# Patient Record
Sex: FEMALE | Race: OTHER | HISPANIC OR LATINO | URBAN - METROPOLITAN AREA
[De-identification: names, ages, dates, MRNs, and addresses within clinical notes are randomized per-mention and may not be internally consistent; named-entity substitution may affect disease eponyms.]

---

## 2018-06-06 PROBLEM — Z00.00 ENCOUNTER FOR PREVENTIVE HEALTH EXAMINATION: Status: ACTIVE | Noted: 2018-06-06

## 2020-02-01 ENCOUNTER — OUTPATIENT (OUTPATIENT)
Dept: OUTPATIENT SERVICES | Facility: HOSPITAL | Age: 65
LOS: 1 days | End: 2020-02-01
Payer: MEDICAID

## 2020-02-26 ENCOUNTER — INPATIENT (INPATIENT)
Facility: HOSPITAL | Age: 65
LOS: 7 days | Discharge: HOME | DRG: 645 | End: 2020-03-05
Attending: PSYCHIATRY & NEUROLOGY | Admitting: PSYCHIATRY & NEUROLOGY
Payer: MEDICAID

## 2020-02-26 VITALS
HEART RATE: 76 BPM | TEMPERATURE: 98 F | OXYGEN SATURATION: 99 % | RESPIRATION RATE: 20 BRPM | DIASTOLIC BLOOD PRESSURE: 95 MMHG | WEIGHT: 205.03 LBS | HEIGHT: 64 IN | SYSTOLIC BLOOD PRESSURE: 155 MMHG

## 2020-02-26 PROCEDURE — 71045 X-RAY EXAM CHEST 1 VIEW: CPT | Mod: 26

## 2020-02-26 PROCEDURE — 99285 EMERGENCY DEPT VISIT HI MDM: CPT

## 2020-02-26 PROCEDURE — 93010 ELECTROCARDIOGRAM REPORT: CPT

## 2020-02-26 RX ORDER — MECLIZINE HCL 12.5 MG
25 TABLET ORAL ONCE
Refills: 0 | Status: COMPLETED | OUTPATIENT
Start: 2020-02-26 | End: 2020-02-26

## 2020-02-26 RX ORDER — METOCLOPRAMIDE HCL 10 MG
10 TABLET ORAL ONCE
Refills: 0 | Status: COMPLETED | OUTPATIENT
Start: 2020-02-26 | End: 2020-02-26

## 2020-02-26 RX ORDER — SODIUM CHLORIDE 9 MG/ML
1000 INJECTION INTRAMUSCULAR; INTRAVENOUS; SUBCUTANEOUS ONCE
Refills: 0 | Status: COMPLETED | OUTPATIENT
Start: 2020-02-26 | End: 2020-02-26

## 2020-02-26 RX ADMIN — Medication 25 MILLIGRAM(S): at 23:45

## 2020-02-26 RX ADMIN — SODIUM CHLORIDE 1000 MILLILITER(S): 9 INJECTION INTRAMUSCULAR; INTRAVENOUS; SUBCUTANEOUS at 23:45

## 2020-02-26 RX ADMIN — Medication 10 MILLIGRAM(S): at 23:45

## 2020-02-26 NOTE — ED PROVIDER NOTE - NS ED ROS FT
GENERAL: No fever or chills, EYES: no change in vision, HEENT: no trouble speaking, CARDIAC: no chest pain, palpitation PULMONARY: no cough or SOB, GI: no abdominal pain, + nausea, no vomiting, no diarrhea or constipation, : No changes in urination, SKIN: no rashes, NEURO: +dizziness, +numbness, no headache,  MSK: No muscle pain ~Sveta Monet MD

## 2020-02-26 NOTE — ED ADULT NURSE NOTE - OBJECTIVE STATEMENT
66 y/o female PMH HTN presents to ED reporting numbness to L side of face, dizziness and weakness. Pt reports symptoms began around 9AM today. Pt also reports feeling nauseous and abdominal pain. On exam, AOx3, speaking in complete sentences. Lung sounds CTA, NAD. Abdomen soft, non-tender, non-distended, normoactive bowel sounds. PERRL 3mm, equal strength and sensation in all 4 extremities. Pt denies CP, SOB, fever/chills at this time. Seen and evaluated by CAROLINA ANDERSON in place NSR HR 60s.

## 2020-02-26 NOTE — ED PROVIDER NOTE - CLINICAL SUMMARY MEDICAL DECISION MAKING FREE TEXT BOX
Sveta Monet MD: 65 yoF PMH Livingston's Palsy left side, HTN, Hyperthyroid p/w dizziness x5 day with numbness right side numbness of face radiating the back of the head. states that she feels like her head feels heavy. aggravated by head position. Most likely peripheral vertigo but will evaluate for stroke. labs, ekg, trop, cth, cxr

## 2020-02-26 NOTE — ED PROVIDER NOTE - OBJECTIVE STATEMENT
Sveta Monet MD: 65 yoF PMH Livingston's Palsy left side, HTN, Hyperthyroid p/w dizziness x5 day with numbness right side numbness of face radiating the back of the head. states that she feels like her head feels heavy. Pt states that her headache is worse with head movement. Pt report nausea but melinda fever, cp, sob, abd pain, blood in stool. Sveta Monet MD: 65 yoF PMH Livingston's Palsy left side, HTN, Hyperthyroid p/w dizziness x5 day with numbness right side numbness of face radiating the back of the head. states that she feels like her head feels heavy. Pt states that her headache is worse with head movement. Pt report nausea but denies fever, cp, sob, abd pain, blood in stool.

## 2020-02-26 NOTE — ED PROVIDER NOTE - ATTENDING CONTRIBUTION TO CARE
65 yoF PMH Livingston's Palsy left side, HTN, Hyperthyroid p/w dizziness x5 day with associated headache, nausea, with sob as well, no cough, chest pain, or fever.  pt vertiginous when she stood, Neuro: Alert, awake,coherent, interactive, cooperative/consolable. Left sided facial palsy chronic upper and lower with otherwise CN 3-12 intact and WITHOUT deficits. NO focal weakness; NO drift; NO droop; pos nystagmus; NO vertigo; NO diplopia; NO headache, NO numbness, tingling, or weakness; NO dysmetria; NO dysdidochokinesia; INTACT gait (tandem, toe and heel). cthead, labs, cxr, treat vertigo and reassess.

## 2020-02-26 NOTE — ED PROVIDER NOTE - PHYSICAL EXAMINATION
Gen: AAOx3, non-toxic  Head: NCAT  HEENT: EOMI, PERRLA, oral mucosa moist, normal conjunctiva  Lung: CTAB, no respiratory distress, no wheezes/rhonchi/rales B/L, speaking in full sentences  CV: RRR, no murmurs, rubs or gallops  Abd: soft, NTND, no guarding, no CVA tenderness, no rebound tenderness  MSK: no visible deformities, full range of motion of all 4 exts  Neuro: Awake, alert, and oriented.  Cranial nerves 2-12 intact.  5/5 strength in all muscle groups.  2+ patellar reflexes bilaterally.  Cerebellar function intact by finger-to-nose testing.  Sensation grossly intact.  Negative Romberg sign.  + ataxic gait falling to he right side.   Skin: Warm, well perfused, no rash  Psych: normal affect.   ~Sveta Monet MD

## 2020-02-27 DIAGNOSIS — Z90.49 ACQUIRED ABSENCE OF OTHER SPECIFIED PARTS OF DIGESTIVE TRACT: Chronic | ICD-10-CM

## 2020-02-27 DIAGNOSIS — Q67.0 CONGENITAL FACIAL ASYMMETRY: ICD-10-CM

## 2020-02-27 DIAGNOSIS — Z98.890 OTHER SPECIFIED POSTPROCEDURAL STATES: Chronic | ICD-10-CM

## 2020-02-27 LAB
ALBUMIN SERPL ELPH-MCNC: 4.4 G/DL — SIGNIFICANT CHANGE UP (ref 3.3–5)
ALP SERPL-CCNC: 125 U/L — HIGH (ref 40–120)
ALT FLD-CCNC: 13 U/L — SIGNIFICANT CHANGE UP (ref 10–45)
ANION GAP SERPL CALC-SCNC: 18 MMOL/L — HIGH (ref 5–17)
APPEARANCE UR: CLEAR — SIGNIFICANT CHANGE UP
AST SERPL-CCNC: 26 U/L — SIGNIFICANT CHANGE UP (ref 10–40)
BILIRUB SERPL-MCNC: 0.2 MG/DL — SIGNIFICANT CHANGE UP (ref 0.2–1.2)
BILIRUB UR-MCNC: NEGATIVE — SIGNIFICANT CHANGE UP
BUN SERPL-MCNC: 15 MG/DL — SIGNIFICANT CHANGE UP (ref 7–23)
CALCIUM SERPL-MCNC: 9.4 MG/DL — SIGNIFICANT CHANGE UP (ref 8.4–10.5)
CHLORIDE SERPL-SCNC: 100 MMOL/L — SIGNIFICANT CHANGE UP (ref 96–108)
CO2 SERPL-SCNC: 22 MMOL/L — SIGNIFICANT CHANGE UP (ref 22–31)
COLOR SPEC: SIGNIFICANT CHANGE UP
CREAT SERPL-MCNC: 0.87 MG/DL — SIGNIFICANT CHANGE UP (ref 0.5–1.3)
DIFF PNL FLD: NEGATIVE — SIGNIFICANT CHANGE UP
GLUCOSE SERPL-MCNC: 137 MG/DL — HIGH (ref 70–99)
GLUCOSE UR QL: NEGATIVE — SIGNIFICANT CHANGE UP
HCT VFR BLD CALC: 42.1 % — SIGNIFICANT CHANGE UP (ref 34.5–45)
HGB BLD-MCNC: 13.5 G/DL — SIGNIFICANT CHANGE UP (ref 11.5–15.5)
KETONES UR-MCNC: NEGATIVE — SIGNIFICANT CHANGE UP
LEUKOCYTE ESTERASE UR-ACNC: ABNORMAL
MCHC RBC-ENTMCNC: 28.1 PG — SIGNIFICANT CHANGE UP (ref 27–34)
MCHC RBC-ENTMCNC: 32.1 GM/DL — SIGNIFICANT CHANGE UP (ref 32–36)
MCV RBC AUTO: 87.7 FL — SIGNIFICANT CHANGE UP (ref 80–100)
NITRITE UR-MCNC: NEGATIVE — SIGNIFICANT CHANGE UP
NRBC # BLD: 0 /100 WBCS — SIGNIFICANT CHANGE UP (ref 0–0)
PH UR: 7 — SIGNIFICANT CHANGE UP (ref 5–8)
PLATELET # BLD AUTO: 282 K/UL — SIGNIFICANT CHANGE UP (ref 150–400)
POTASSIUM SERPL-MCNC: 3.1 MMOL/L — LOW (ref 3.5–5.3)
POTASSIUM SERPL-SCNC: 3.1 MMOL/L — LOW (ref 3.5–5.3)
PROT SERPL-MCNC: 7.9 G/DL — SIGNIFICANT CHANGE UP (ref 6–8.3)
PROT UR-MCNC: NEGATIVE — SIGNIFICANT CHANGE UP
RBC # BLD: 4.8 M/UL — SIGNIFICANT CHANGE UP (ref 3.8–5.2)
RBC # FLD: 13.7 % — SIGNIFICANT CHANGE UP (ref 10.3–14.5)
SODIUM SERPL-SCNC: 140 MMOL/L — SIGNIFICANT CHANGE UP (ref 135–145)
SP GR SPEC: 1.01 — SIGNIFICANT CHANGE UP (ref 1.01–1.02)
TROPONIN T, HIGH SENSITIVITY RESULT: 9 NG/L — SIGNIFICANT CHANGE UP (ref 0–51)
TROPONIN T, HIGH SENSITIVITY RESULT: 9 NG/L — SIGNIFICANT CHANGE UP (ref 0–51)
UROBILINOGEN FLD QL: NEGATIVE — SIGNIFICANT CHANGE UP
WBC # BLD: 8.1 K/UL — SIGNIFICANT CHANGE UP (ref 3.8–10.5)
WBC # FLD AUTO: 8.1 K/UL — SIGNIFICANT CHANGE UP (ref 3.8–10.5)

## 2020-02-27 PROCEDURE — 70496 CT ANGIOGRAPHY HEAD: CPT | Mod: 26

## 2020-02-27 PROCEDURE — 70553 MRI BRAIN STEM W/O & W/DYE: CPT | Mod: 26

## 2020-02-27 PROCEDURE — 99223 1ST HOSP IP/OBS HIGH 75: CPT

## 2020-02-27 PROCEDURE — 70450 CT HEAD/BRAIN W/O DYE: CPT | Mod: 26,59

## 2020-02-27 PROCEDURE — 93306 TTE W/DOPPLER COMPLETE: CPT | Mod: 26

## 2020-02-27 RX ORDER — ENOXAPARIN SODIUM 100 MG/ML
40 INJECTION SUBCUTANEOUS DAILY
Refills: 0 | Status: DISCONTINUED | OUTPATIENT
Start: 2020-02-27 | End: 2020-03-01

## 2020-02-27 RX ORDER — GABAPENTIN 400 MG/1
400 CAPSULE ORAL
Refills: 0 | Status: DISCONTINUED | OUTPATIENT
Start: 2020-02-27 | End: 2020-03-04

## 2020-02-27 RX ORDER — ASPIRIN/CALCIUM CARB/MAGNESIUM 324 MG
81 TABLET ORAL DAILY
Refills: 0 | Status: DISCONTINUED | OUTPATIENT
Start: 2020-02-27 | End: 2020-03-01

## 2020-02-27 RX ORDER — ZOLPIDEM TARTRATE 10 MG/1
1 TABLET ORAL
Qty: 0 | Refills: 0 | DISCHARGE

## 2020-02-27 RX ORDER — FOLIC ACID 0.8 MG
1 TABLET ORAL DAILY
Refills: 0 | Status: DISCONTINUED | OUTPATIENT
Start: 2020-02-27 | End: 2020-03-05

## 2020-02-27 RX ORDER — HYDROCHLOROTHIAZIDE 25 MG
12.5 TABLET ORAL DAILY
Refills: 0 | Status: DISCONTINUED | OUTPATIENT
Start: 2020-02-27 | End: 2020-03-05

## 2020-02-27 RX ORDER — ACETAMINOPHEN 500 MG
650 TABLET ORAL EVERY 6 HOURS
Refills: 0 | Status: DISCONTINUED | OUTPATIENT
Start: 2020-02-27 | End: 2020-03-05

## 2020-02-27 RX ORDER — ACETAMINOPHEN 500 MG
975 TABLET ORAL ONCE
Refills: 0 | Status: COMPLETED | OUTPATIENT
Start: 2020-02-27 | End: 2020-02-27

## 2020-02-27 RX ORDER — CLOPIDOGREL BISULFATE 75 MG/1
75 TABLET, FILM COATED ORAL DAILY
Refills: 0 | Status: DISCONTINUED | OUTPATIENT
Start: 2020-02-27 | End: 2020-02-28

## 2020-02-27 RX ORDER — FOLIC ACID 0.8 MG
1 TABLET ORAL
Qty: 0 | Refills: 0 | DISCHARGE

## 2020-02-27 RX ORDER — POTASSIUM CHLORIDE 20 MEQ
40 PACKET (EA) ORAL ONCE
Refills: 0 | Status: COMPLETED | OUTPATIENT
Start: 2020-02-27 | End: 2020-02-27

## 2020-02-27 RX ORDER — GABAPENTIN 400 MG/1
1 CAPSULE ORAL
Qty: 0 | Refills: 0 | DISCHARGE

## 2020-02-27 RX ORDER — ATORVASTATIN CALCIUM 80 MG/1
80 TABLET, FILM COATED ORAL AT BEDTIME
Refills: 0 | Status: DISCONTINUED | OUTPATIENT
Start: 2020-02-27 | End: 2020-03-04

## 2020-02-27 RX ORDER — LOSARTAN POTASSIUM 100 MG/1
50 TABLET, FILM COATED ORAL DAILY
Refills: 0 | Status: DISCONTINUED | OUTPATIENT
Start: 2020-02-27 | End: 2020-03-03

## 2020-02-27 RX ADMIN — Medication 650 MILLIGRAM(S): at 15:40

## 2020-02-27 RX ADMIN — Medication 12.5 MILLIGRAM(S): at 13:04

## 2020-02-27 RX ADMIN — CLOPIDOGREL BISULFATE 75 MILLIGRAM(S): 75 TABLET, FILM COATED ORAL at 13:03

## 2020-02-27 RX ADMIN — Medication 40 MILLIEQUIVALENT(S): at 01:55

## 2020-02-27 RX ADMIN — ATORVASTATIN CALCIUM 80 MILLIGRAM(S): 80 TABLET, FILM COATED ORAL at 21:49

## 2020-02-27 RX ADMIN — LOSARTAN POTASSIUM 50 MILLIGRAM(S): 100 TABLET, FILM COATED ORAL at 13:03

## 2020-02-27 RX ADMIN — Medication 30 MILLIGRAM(S): at 13:36

## 2020-02-27 RX ADMIN — Medication 1 MILLIGRAM(S): at 13:03

## 2020-02-27 RX ADMIN — GABAPENTIN 400 MILLIGRAM(S): 400 CAPSULE ORAL at 19:22

## 2020-02-27 RX ADMIN — Medication 975 MILLIGRAM(S): at 05:18

## 2020-02-27 RX ADMIN — ENOXAPARIN SODIUM 40 MILLIGRAM(S): 100 INJECTION SUBCUTANEOUS at 13:04

## 2020-02-27 RX ADMIN — Medication 81 MILLIGRAM(S): at 13:04

## 2020-02-27 NOTE — PHYSICAL THERAPY INITIAL EVALUATION ADULT - IMPAIRMENTS FOUND, PT EVAL
joint integrity and mobility/muscle strength/aerobic capacity/endurance/gait, locomotion, and balance

## 2020-02-27 NOTE — OCCUPATIONAL THERAPY INITIAL EVALUATION ADULT - ADDITIONAL COMMENTS
CONTINUED. CT HEAD 2/27: No acute intracranial hemorrhage. Asymmetric prominence of the right superior anastomotic vein, indeterminate. This may represent anatomic variation, but in the appropriate clinical setting, the thrombosis is a consideration. Contrast-enhanced MRV can be considered if clinically warranted.  CTA HEAD 2/17: Normal CTV head without cortical venous or deep dural sinus thrombosis.

## 2020-02-27 NOTE — CONSULT NOTE ADULT - SUBJECTIVE AND OBJECTIVE BOX
CHIEF COMPLAINT:    HISTORY OF PRESENT ILLNESS:    PAST MEDICAL & SURGICAL HISTORY:  Miscarriage: 1 spontaneous 1st trimester miscarriage  Hypothyroid: S/P Radioactive iodine  Vertigo  H/O Bell's palsy: Left sided  HTN (hypertension)  HLD (hyperlipidemia)  History of hysteroscopy  S/P appendectomy          MEDICATIONS:  aspirin enteric coated 81 milliGRAM(s) Oral daily  clopidogrel Tablet 75 milliGRAM(s) Oral daily  enoxaparin Injectable 40 milliGRAM(s) SubCutaneous daily  hydrochlorothiazide 12.5 milliGRAM(s) Oral daily  losartan 50 milliGRAM(s) Oral daily        gabapentin 400 milliGRAM(s) Oral two times a day  PARoxetine 30 milliGRAM(s) Oral daily      atorvastatin 80 milliGRAM(s) Oral at bedtime    folic acid 1 milliGRAM(s) Oral daily      FAMILY HISTORY:  Family history of miscarriage: Mother had two miscarriages, not sure what trimester      SOCIAL HISTORY:    [ ] Non-smoker  [ ] Smoker  [ ] Alcohol    Allergies    No Known Allergies    Intolerances    	    REVIEW OF SYSTEMS:  CONSTITUTIONAL: No fever, weight loss, or fatigue  EYES: No eye pain, visual disturbances, or discharge  ENMT:  No difficulty hearing, tinnitus, vertigo; No sinus or throat pain  NECK: No pain or stiffness  RESPIRATORY: No cough, wheezing, chills or hemoptysis; No Shortness of Breath  CARDIOVASCULAR: No chest pain, palpitations, passing out, dizziness, or leg swelling  GASTROINTESTINAL: No abdominal or epigastric pain. No nausea, vomiting, or hematemesis; No diarrhea or constipation. No melena or hematochezia.  GENITOURINARY: No dysuria, frequency, hematuria, or incontinence  NEUROLOGICAL: No headaches, memory loss, loss of strength, numbness, or tremors  SKIN: No itching, burning, rashes, or lesions   LYMPH Nodes: No enlarged glands  ENDOCRINE: No heat or cold intolerance; No hair loss  MUSCULOSKELETAL: No joint pain or swelling; No muscle, back, or extremity pain  PSYCHIATRIC: No depression, anxiety, mood swings, or difficulty sleeping  HEME/LYMPH: No easy bruising, or bleeding gums  ALLERY AND IMMUNOLOGIC: No hives or eczema	    [ ] All others negative	  [ ] Unable to obtain    PHYSICAL EXAM:  T(C): 36.8 (02-27-20 @ 12:28), Max: 36.9 (02-26-20 @ 18:22)  HR: 72 (02-27-20 @ 12:28) (62 - 76)  BP: 142/79 (02-27-20 @ 12:28) (125/71 - 155/95)  RR: 16 (02-27-20 @ 12:28) (14 - 20)  SpO2: 97% (02-27-20 @ 12:28) (97% - 100%)  Wt(kg): --  I&O's Summary      Appearance: Normal	  HEENT:   Normal oral mucosa, PERRL, EOMI	  Lymphatic: No lymphadenopathy  Cardiovascular: Normal S1 S2, No JVD, No murmurs, No edema  Respiratory: Lungs clear to auscultation	  Psychiatry: A & O x 3, Mood & affect appropriate  Gastrointestinal:  Soft, Non-tender, + BS	  Skin: No rashes, No ecchymoses, No cyanosis	  Neurologic: Non-focal  Extremities: Normal range of motion, No clubbing, cyanosis or edema  Vascular: Peripheral pulses palpable 2+ bilaterally    TELEMETRY: 	SR    ECG:  	  RADIOLOGY:  < from: CT Angio Head w/ IV Cont (02.27.20 @ 03:08) >    EXAM:  CT ANGIO BRAIN (W)AW IC                            PROCEDURE DATE:  02/27/2020            INTERPRETATION:  CLINICAL INFORMATION: Dizziness, nondiagnostic head CT for cortical vein thrombosis.    EXAM: Axial CT images of the head were obtained after the administration of IV contrast, contrast bolus was optimized to evaluate the deep veins. Multiplanar and 3-D/MIP reformats were generated. 70 cc Omnipaque 300 administered, 30 cc discarded.     CT dose lowering techniques were used, to include: automated exposure control, adjustment for patient size, and/or use of iterative reconstruction.?     COMPARISON: Same day noncontrast CT head    FINDINGS:    The deep dural venous sinuses and cortical veins are patent. There is asymmetric prominence of the right superior anastomotic vein without cortical vein thrombosis. Calcified plaque is present in the intracranial portion of the internal carotid arteries. Incidental fetal origin of the right PCA.    No mass effect or hydrocephalus. Parenchymal volume and density is within normal limits for age.    Partial effusion in the right mastoid. Scattered paranasal sinus mucosal thickening without air-fluid level. Intact calvarium.     IMPRESSION:    Normal CTV head without cortical venous or deep dural sinus thrombosis..                KRIS CURRY M.D., RADIOLOGY RESIDENT  This document has been electronically signed.  MELVA MARROQUIN M.D., ATTENDING RADIOLOGIST  This document has been electronically signed. Feb 27 2020  4:01AM                < end of copied text >    OTHER: 	  	  LABS:	 	    CARDIAC MARKERS:                                  13.5   8.10  )-----------( 282      ( 26 Feb 2020 23:55 )             42.1     02-26    140  |  100  |  15  ----------------------------<  137<H>  3.1<L>   |  22  |  0.87    Ca    9.4      26 Feb 2020 23:55    TPro  7.9  /  Alb  4.4  /  TBili  0.2  /  DBili  x   /  AST  26  /  ALT  13  /  AlkPhos  125<H>  02-26    proBNP:   Lipid Profile:   HgA1c:   TSH: CHIEF COMPLAINT:  CVA      HISTORY OF PRESENT ILLNESS:  65y R-handed F with PMH of HTN, HLD, DM, L. sided bell's palsy, vertigo, hypothyroidism S/P radioactive Iodine presenting with s/o HA, subjective facial numbness and facial heaviness, vertical and horizontal diplopia unclear if binocular or monocular, and gait dysequilibrium.  LKN: Unclear precise time, but at least 5 days ago.     Describes feeling numbness in her face as well as HA first.  Describes HA as bifrontal radiating to the back and throbbing in nature waxing from 2-8/10 w/o photophobia or phonophobia.  Endorses associated nausea.  She then developed facial droop on the right and feeling unsteady with her gait requiring touch off from furniture frequently.  She describes dizziness as light-headedness and pre-syncopal sensation.  All of her symptoms are exacerbated by position changes.   She additionally reports having vertigo in the past, but notes this is distinctly different as she does not feel the room spinning sensation she had at initial presentations.     Neurology called for potential of venous sinus thrombosis.  CTV confirmed.       PAST MEDICAL & SURGICAL HISTORY:  Miscarriage: 1 spontaneous 1st trimester miscarriage  Hypothyroid: S/P Radioactive iodine  Vertigo  H/O Bell's palsy: Left sided  HTN (hypertension)  HLD (hyperlipidemia)  History of hysteroscopy  S/P appendectomy          MEDICATIONS:  aspirin enteric coated 81 milliGRAM(s) Oral daily  clopidogrel Tablet 75 milliGRAM(s) Oral daily  enoxaparin Injectable 40 milliGRAM(s) SubCutaneous daily  hydrochlorothiazide 12.5 milliGRAM(s) Oral daily  losartan 50 milliGRAM(s) Oral daily        gabapentin 400 milliGRAM(s) Oral two times a day  PARoxetine 30 milliGRAM(s) Oral daily      atorvastatin 80 milliGRAM(s) Oral at bedtime    folic acid 1 milliGRAM(s) Oral daily      FAMILY HISTORY:  Family history of miscarriage: Mother had two miscarriages, not sure what trimester      SOCIAL HISTORY:    [ ] Non-smoker  [ ] Smoker  [ ] Alcohol    Allergies    No Known Allergies    Intolerances    	    REVIEW OF SYSTEMS:  CONSTITUTIONAL: No fever, weight loss, or fatigue  EYES: No eye pain, visual disturbances, or discharge  ENMT:  No difficulty hearing, tinnitus, vertigo; No sinus or throat pain  NECK: No pain or stiffness  RESPIRATORY: No cough, wheezing, chills or hemoptysis; No Shortness of Breath  CARDIOVASCULAR: No chest pain, palpitations, passing out, dizziness, or leg swelling  GASTROINTESTINAL: No abdominal or epigastric pain. No nausea, vomiting, or hematemesis; No diarrhea or constipation. No melena or hematochezia.  GENITOURINARY: No dysuria, frequency, hematuria, or incontinence  NEUROLOGICAL: No headaches, memory loss, loss of strength, numbness, or tremors  SKIN: No itching, burning, rashes, or lesions   LYMPH Nodes: No enlarged glands  ENDOCRINE: No heat or cold intolerance; No hair loss  MUSCULOSKELETAL: No joint pain or swelling; No muscle, back, or extremity pain  PSYCHIATRIC: No depression, anxiety, mood swings, or difficulty sleeping  HEME/LYMPH: No easy bruising, or bleeding gums  ALLERY AND IMMUNOLOGIC: No hives or eczema	    [ ] All others negative	  [ ] Unable to obtain    PHYSICAL EXAM:  T(C): 36.8 (02-27-20 @ 12:28), Max: 36.9 (02-26-20 @ 18:22)  HR: 72 (02-27-20 @ 12:28) (62 - 76)  BP: 142/79 (02-27-20 @ 12:28) (125/71 - 155/95)  RR: 16 (02-27-20 @ 12:28) (14 - 20)  SpO2: 97% (02-27-20 @ 12:28) (97% - 100%)  Wt(kg): --  I&O's Summary      Appearance: Normal	  HEENT:   Normal oral mucosa, PERRL, EOMI	  Lymphatic: No lymphadenopathy  Cardiovascular: Normal S1 S2, No JVD, No murmurs, No edema  Respiratory: Lungs clear to auscultation	  Psychiatry: A & O x 3, Mood & affect appropriate  Gastrointestinal:  Soft, Non-tender, + BS	  Skin: No rashes, No ecchymoses, No cyanosis	  Extremities: Normal range of motion, No clubbing, cyanosis or edema  Vascular: Peripheral pulses palpable 2+ bilaterally  Neuro   	Mental Status: Awake, alert, oriented to person, place, situation, time. Normal affect. Follows all commands.  	Language: No dysarthria, fluent with preserved naming, repetition and comprehension.    	Cranial Nerves: PERRLA (R = 3mm, L = 3mm). Visual fields intact. EOMI no nystagmus. V1-3 intact to light touch.  Mild L. facial droop at nasolabial fold.  Mod-Sev R. facial droop including frontalis.  Mildly reduced R. eye closure strength. Hearing grossly normal to conversation but hears tuning fork louder on left vs right.  Symmetric palate elevation in midline.  Head turning & shoulder shrug intact b/l. Tongue midline, normal movements, no atrophy.  	Motor: Normal muscle bulk & tone. No noticeable tremor, myoclonus or pronator drift. 5/5 strength on individual strength testing, LLE give way weakness due to pain.  	Sensation: Symmetric B/L preserved sensation to light touch, pin prick, position.    	Cortical: No extinction on double simultaneous touch and no signs of neglect.   	Reflexes: 2/4 throughout.  Plantar Responses are downgoing B/L  	Coordination: Intact rapid-alternating movements. No dysmetria on finger-to-nose or heel-to-shin.  No dysdiadochokinesia  	Gait: Requires two person assist to stand, wide stance, reduced stride length, touch off, arm swing and tucker.   Abnormal Romberg. Moderate to severe postural instability.    	HINTS test: No nystagmus and no skew.  R. head impulse induced symptoms of dizziness.  L. head impulse induced dizziness less so and mild neck pain.   [place]: No nystagmus.  TELEMETRY: 	SR    ECG:  	  RADIOLOGY:  < from: CT Angio Head w/ IV Cont (02.27.20 @ 03:08) >    EXAM:  CT ANGIO BRAIN (W)AW IC                            PROCEDURE DATE:  02/27/2020            INTERPRETATION:  CLINICAL INFORMATION: Dizziness, nondiagnostic head CT for cortical vein thrombosis.    EXAM: Axial CT images of the head were obtained after the administration of IV contrast, contrast bolus was optimized to evaluate the deep veins. Multiplanar and 3-D/MIP reformats were generated. 70 cc Omnipaque 300 administered, 30 cc discarded.     CT dose lowering techniques were used, to include: automated exposure control, adjustment for patient size, and/or use of iterative reconstruction.?     COMPARISON: Same day noncontrast CT head    FINDINGS:    The deep dural venous sinuses and cortical veins are patent. There is asymmetric prominence of the right superior anastomotic vein without cortical vein thrombosis. Calcified plaque is present in the intracranial portion of the internal carotid arteries. Incidental fetal origin of the right PCA.    No mass effect or hydrocephalus. Parenchymal volume and density is within normal limits for age.    Partial effusion in the right mastoid. Scattered paranasal sinus mucosal thickening without air-fluid level. Intact calvarium.     IMPRESSION:    Normal CTV head without cortical venous or deep dural sinus thrombosis..                KRIS CURRY M.D., RADIOLOGY RESIDENT  This document has been electronically signed.  MELVA MARROQUIN M.D., ATTENDING RADIOLOGIST  This document has been electronically signed. 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 Facial droop..    TECHNIQUE: MRI of the brain was performed without and with contrast using the following sequences: Axial DWI/ADC map, axial SWI, axial gradient echo, axial SPGR, sagittal T1 FLAIR, axial T2 FLAIR, axial T2 FSE axial T1 SPGR postcontrast and axial/coronal/sagittal T1 spin-echo postcontrast. Thin sections were obtained through the internal auditory canals with and without contrast. Eventually have to charge in    10 mls of Gadavist was administered intravenously without complication and 0 mls were discarded.    COMPARISON: CT head dated 2/27/2020    FINDINGS:     There is no evidence of acute infarct, intracranial hemorrhage, mass effect or midline shift.    There is no abnormal intracranial enhancement.    Ventricles and sulci are age-appropriate in size.    There is mild periventricular and scattered nonenhancing foci of T2 and FLAIR signal hyperintensity consistent with mild microvascular-type changes.    There is no extra-axial fluid collection.    Major intracranial flow-voids are preserved.    The orbits, sellar and suprasellar structures, and craniocervical junction are unremarkable.     The calvarium demonstrates normal signal intensity.    There is patchy opacification of the right mastoid air cells. There is mild mucosal thickening involving the bilateral maxillary sinuses.    IMPRESSION:     No acute intracranial pathology or abnormal enhancement. Scattered small vessel white matter ischemic changes. No acute infarcts, hemorrhage or mass.                KORI MCKEON M.D., RADIOLOGY FELLOW  This document has been electronically signed.  JEANETTE VASQUEZ M.D., ATTENDING RADIOLOGIST  This document has been electronically signed. Feb 28 2020  9:18AM                < end of copied text >        < end of copied text >    OTHER: 	  	  LABS:	 	    CARDIAC MARKERS:                                  13.5   8.10  )-----------( 282      ( 26 Feb 2020 23:55 )             42.1     02-26    140  |  100  |  15  ----------------------------<  137<H>  3.1<L>   |  22  |  0.87    Ca    9.4      26 Feb 2020 23:55    TPro  7.9  /  Alb  4.4  /  TBili  0.2  /  DBili  x   /  AST  26  /  ALT  13  /  AlkPhos  125<H>  02-26    proBNP:   Lipid Profile:   HgA1c:   TSH:

## 2020-02-27 NOTE — OCCUPATIONAL THERAPY INITIAL EVALUATION ADULT - DIAGNOSIS, OT EVAL
Pt presents with pain, decreased strength, endurance, and balance all impacting ability to perform ADLs and functional mobility.

## 2020-02-27 NOTE — H&P ADULT - ASSESSMENT
INCOMPLETE  Assessment:  65y R-handed F with PMH of HTN, HLD, DM, L. sided bell's palsy, vertigo, hypothyroidism presenting with s/o HA, subjective facial numbness and facial heaviness, vertical and horizontal diplopia unclear if binocular or monocular, and gait dysequilibrium.    On exam, she has a chronic L. facial droop at nasolabial fold, synkinesis of lip, decreased hearing on left to tuning fork, Moderate R. facial droop including frontalis, and significant gait instabilty requiring 2 person assist to stand.     LKW: Unclear timing, but possibly Sunday morning  NIHSS: 4 (1 for chronic left sided deficits)  Baseline MRS: 0   Not a tPA candidate due to unclear LKN  Not a thrombectomy candidate due to unclear LKN    Stroke workup:  -MRI brain w/o contrast.   -Trans-thoracic echocardiogram with bubble study  -Continuous cardiac telemetry to monitor for arrhythmia  -Stroke labwork: HgbA1C, lipid panel    Stroke acute management:   - Aspirin 81 and Plavix 75 daily    -Atorvastatin 80 daily    - Frequent neuro-checks (q4h)   - Baseline EKG and CXR   - When patient passed bedside swallow eval, start on dysphagia diet.    - Head of bed > 30 degrees for aspiration prevention and aspiration precautions ordered.    Secondary prevention of stroke:   -Tight glucose control (long-term goal HgbA1c < 6%)   -Stroke education and counseling    Stroke rehabilitation:  -Physical therapy, occupational therapy, speech therapy consults  -Consulted social work and case management for help with discharge    Prophylaxis: Chemical DVT PPX      Rudy Crawford, DO  PGY-2 Neurology Service Assessment:  65y R-handed F with PMH of HTN, HLD, DM, L. sided bell's palsy, vertigo, hypothyroidism presenting with s/o HA, subjective facial numbness and facial heaviness, vertical and horizontal diplopia unclear if binocular or monocular, and gait dysequilibrium.    On exam, she has a chronic L. facial droop at nasolabial fold, synkinesis of lip, decreased hearing on left to tuning fork, Moderate R. facial droop including frontalis, and significant gait instabilty requiring 2 person assist to stand.     LKW: Unclear timing, but possibly Sunday morning  NIHSS: 4 (1 for chronic left sided deficits)  Baseline MRS: 0   Not a tPA candidate due to unclear LKN  Not a thrombectomy candidate due to unclear LKN    Stroke workup:  -MRI brain w/o contrast.   -Trans-thoracic echocardiogram with bubble study  -Continuous cardiac telemetry to monitor for arrhythmia  -Stroke labwork: HgbA1C, lipid panel    Stroke acute management:   - Aspirin 81 and Plavix 75 daily    -Atorvastatin 80 daily    - Frequent neuro-checks (q4h)   - Baseline EKG and CXR   - When patient passed bedside swallow eval, start on dysphagia diet.    - Head of bed > 30 degrees for aspiration prevention and aspiration precautions ordered.    Secondary prevention of stroke:   -Tight glucose control (long-term goal HgbA1c < 6%)   -Stroke education and counseling    Stroke rehabilitation:  -Physical therapy, occupational therapy, speech therapy consults  -Consulted social work and case management for help with discharge    Prophylaxis: Chemical DVT PPX      Rudy Crawford, DO  PGY-2 Neurology Service

## 2020-02-27 NOTE — CONSULT NOTE ADULT - ATTENDING COMMENTS
Advanced care planning was discussed with patient and family.  Advanced care planning forms were reviewed and discussed as appropriate.  Differential diagnosis and plan of care discussed with patient after the evaluation.   Pain assessed and judicious use of narcotics when appropriate was discussed.  Importance of Fall prevention discussed.  Counseling on Smoking and Alcohol cessation was offered when appropriate.  Counseling on Diet, exercise, and medication compliance was done.

## 2020-02-27 NOTE — ED ADULT NURSE REASSESSMENT NOTE - NS ED NURSE REASSESS COMMENT FT1
Received female Aox3 in no apparent distress. Pt reports she is feeling a little better. Pt continues to report leg weakness and inability to ambulate. No swelling noted to lower extremities. Vitals signs stable ; awaiting disposition.

## 2020-02-27 NOTE — OCCUPATIONAL THERAPY INITIAL EVALUATION ADULT - PERTINENT HX OF CURRENT PROBLEM, REHAB EVAL
64 y/o F PMH of HTN, HLD, DM, L. sided bell's palsy, vertigo, hypothyroidism S/P radioactive Iodine presenting with s/o HA, subjective facial numbness and facial heaviness, vertical and horizontal diplopia unclear if binocular or monocular, and gait dysequilibrium. SEE BELOW.

## 2020-02-27 NOTE — PHYSICAL THERAPY INITIAL EVALUATION ADULT - CRITERIA FOR SKILLED THERAPEUTIC INTERVENTIONS
anticipated discharge recommendation/rehab potential/predicted duration of therapy intervention/functional limitations in following categories/risk reduction/prevention/impairments found/therapy frequency/anticipated equipment needs at discharge

## 2020-02-27 NOTE — H&P ADULT - NSICDXFAMILYHX_GEN_ALL_CORE_FT
FAMILY HISTORY:  Family history of miscarriage, Mother had two miscarriages, not sure what trimester

## 2020-02-27 NOTE — H&P ADULT - NSICDXPASTMEDICALHX_GEN_ALL_CORE_FT
PAST MEDICAL HISTORY:  HLD (hyperlipidemia) PAST MEDICAL HISTORY:  H/O Bell's palsy Left sided    HLD (hyperlipidemia)     HTN (hypertension)     Hypothyroid S/P Radioactive iodine    Miscarriage 1 spontaneous 1st trimester miscarriage    Vertigo

## 2020-02-27 NOTE — OCCUPATIONAL THERAPY INITIAL EVALUATION ADULT - VISUAL ACUITY
pt wears glasses and reports no new changes/not tested not tested/pt wears glasses & reports blurry/double vision

## 2020-02-27 NOTE — H&P ADULT - ATTENDING COMMENTS
65-year-old right-handed lady first evaluated at [facility] on 2/27/2020 with imbalance.  History and exam as above, with minor changes.  ROS otherwise negative.  Exam.  Alert and attentive; mild, chronic left lower motor neuron facial palsy with synkinesis; no right facial palsy; no right ptosis; gait not tested; remainder of neurologic exam was nonfocal.  CT head (2/26/2020) to my eye was unremarkable.  CTV (2/26/2020) was unremarkable.  CTA head (2/27/2020) to my eye showed calcified plaque in the ICAs without significant stenosis.  There was a fetal origin of the right PCA.  Impression.  She has a history of vertigo going back about 10 years prior to admission.  About 1 week prior to admission she had her usual vertigo lasting several days.  Also about 1 week prior to admission, she had bloody diarrhea.  On 2/26/2020, she noted imbalance without actual vertigo, "bilateral" facial numbness, a throbbing headache, nausea, vertical and horizontal (perhaps oblique?)  Diplopia, and possibly dysarthria.  Neurologic exam is remarkable only for a chronic left Bell's palsy.  Her clinical presentation is ambiguous, but could possibly localize to the brainstem, perhaps the ozzy.  Etiology is uncertain, but possibly a small ischemic stroke of unknown mechanism.  Other possibilities include a stroke mimic such as migraine with brainstem features, or a completely different condition such as a Guillain-Barré variant.  Plan.  MRI brain; TTE; further management will be based on results of her MRI, but for now we will treat as if she had cerebral ischemia, and medications can be stopped subsequently if appropriate; Plavix 300 mg loading dose, then 75 mg daily for 3 weeks; aspirin 81 mg daily-indefinitely; atorvastatin 80 mg daily-Target LDL less than 70; PT/OT. 65-year-old right-handed lady first evaluated at [facility] on 2/27/2020 with imbalance.  History and exam as above, with minor changes.  ROS otherwise negative.  Exam.  Alert and attentive; mild, chronic left lower motor neuron facial palsy with synkinesis; no right facial palsy; no right ptosis; gait not tested; remainder of neurologic exam was nonfocal.  CT head (2/26/2020) to my eye was unremarkable.  CTV (2/26/2020) was unremarkable.  CTA head (2/27/2020) to my eye showed calcified plaque in the ICAs without significant stenosis.  There was a fetal origin of the right PCA.  Impression.  She has a history of vertigo going back about 10 years prior to admission.  About 1 week prior to admission she had her usual vertigo lasting several days.  Also about 1 week prior to admission, she had bloody diarrhea.  On 2/26/2020, she noted imbalance without actual vertigo, "bilateral" facial numbness, a throbbing headache, nausea, vertical and horizontal (perhaps oblique?)  Diplopia, and possibly dysarthria.  Neurologic exam is remarkable only for a chronic left Bell's palsy.  Her clinical presentation is ambiguous, but could possibly localize to the brainstem, perhaps the ozzy.  Etiology is uncertain, but possibly a small ischemic stroke of unknown mechanism.  Other possibilities include a stroke mimic such as migraine with brainstem features, or a completely different condition such as a Guillain-Barré variant.  Plan.  MRI brain; MRA neck and head; TTE; further management will be based on results of her MRI, but for now we will treat as if she had cerebral ischemia, and medications can be stopped subsequently if appropriate; Plavix 300 mg loading dose, then 75 mg daily for 3 weeks; aspirin 81 mg daily-indefinitely; atorvastatin 80 mg daily-Target LDL less than 70; PT/OT.

## 2020-02-27 NOTE — PHYSICAL THERAPY INITIAL EVALUATION ADULT - ADDITIONAL COMMENTS
CONTINUED. CT HEAD 2/27: No acute intracranial hemorrhage. Asymmetric prominence of the right superior anastomotic vein, indeterminate. This may represent anatomic variation, but in the appropriate clinical setting, the thrombosis is a consideration. Contrast-enhanced MRV can be considered if clinically warranted.  CTA HEAD 2/17: Normal CTV head without cortical venous or deep dural sinus thrombosis

## 2020-02-27 NOTE — H&P ADULT - HISTORY OF PRESENT ILLNESS
65y R-handed F with PMH of HTN, HLD, DM, L. sided bell's palsy, vertigo, hypothyroidism S/P radioactive Iodine presenting with s/o HA, subjective facial numbness and facial heaviness, vertical and horizontal diplopia unclear if binocular or monocular, and gait dysequilibrium.  LKN: Unclear precise time, but at least 5 days ago.     Describes feeling numbness in her face as well as HA first.  Describes HA as bifrontal radiating to the back and throbbing in nature waxing from 2-8/10 w/o photophobia or phonophobia.  Endorses associated nausea.  She then developed facial droop on the right and feeling unsteady with her gait requiring touch off from furniture frequently.  She describes dizziness as light-headedness and pre-syncopal sensation.  All of her symptoms are exacerbated by position changes.   She additionally reports having vertigo in the past, but notes this is distinctly different as she does not feel the room spinning sensation she had at initial presentations.     Neurology called for potential of venous sinus thrombosis.  CTV confirmed.

## 2020-02-27 NOTE — OCCUPATIONAL THERAPY INITIAL EVALUATION ADULT - LIVES WITH, PROFILE
Pt lives in an apartment with her daughter +3 steps to enter, +first floor set-up, +shower tub. Pt's other daughter reports she also spends a lot of time with her at her house with her children.

## 2020-02-27 NOTE — CHART NOTE - NSCHARTNOTEFT_GEN_A_CORE
Obtain screening lower extremity venous ultrasound in patients who meet 1 or more of the following criteria as patient is high risk for DVT/PE on admission:   [] History of DVT/PE  []Hypercoagulable states (Factor V Leiden, Cancer, OCP, etc. )  []Prolonged immobility (hemiplegia/hemiparesis/post operative or any other extended immobilization)  [] Transferred from outside facility (Rehab or Long term care)  [] Age </= to 50.

## 2020-02-28 DIAGNOSIS — E03.9 HYPOTHYROIDISM, UNSPECIFIED: ICD-10-CM

## 2020-02-28 DIAGNOSIS — Z86.69 PERSONAL HISTORY OF OTHER DISEASES OF THE NERVOUS SYSTEM AND SENSE ORGANS: ICD-10-CM

## 2020-02-28 DIAGNOSIS — I10 ESSENTIAL (PRIMARY) HYPERTENSION: ICD-10-CM

## 2020-02-28 LAB
ANION GAP SERPL CALC-SCNC: 15 MMOL/L — SIGNIFICANT CHANGE UP (ref 5–17)
BUN SERPL-MCNC: 15 MG/DL — SIGNIFICANT CHANGE UP (ref 7–23)
CALCIUM SERPL-MCNC: 9.3 MG/DL — SIGNIFICANT CHANGE UP (ref 8.4–10.5)
CHLORIDE SERPL-SCNC: 100 MMOL/L — SIGNIFICANT CHANGE UP (ref 96–108)
CHOLEST SERPL-MCNC: 289 MG/DL — HIGH (ref 10–199)
CO2 SERPL-SCNC: 20 MMOL/L — LOW (ref 22–31)
CREAT SERPL-MCNC: 1.03 MG/DL — SIGNIFICANT CHANGE UP (ref 0.5–1.3)
ESTIMATED AVERAGE GLUCOSE: 126 MG/DL — HIGH (ref 68–114)
GLUCOSE SERPL-MCNC: 105 MG/DL — HIGH (ref 70–99)
HBA1C BLD-MCNC: 6 % — HIGH (ref 4–5.6)
HCT VFR BLD CALC: 40.4 % — SIGNIFICANT CHANGE UP (ref 34.5–45)
HCV AB S/CO SERPL IA: 0.35 S/CO — SIGNIFICANT CHANGE UP (ref 0–0.99)
HCV AB SERPL-IMP: SIGNIFICANT CHANGE UP
HDLC SERPL-MCNC: 65 MG/DL — SIGNIFICANT CHANGE UP
HGB BLD-MCNC: 12.8 G/DL — SIGNIFICANT CHANGE UP (ref 11.5–15.5)
LIPID PNL WITH DIRECT LDL SERPL: 187 MG/DL — HIGH
MCHC RBC-ENTMCNC: 28.1 PG — SIGNIFICANT CHANGE UP (ref 27–34)
MCHC RBC-ENTMCNC: 31.7 GM/DL — LOW (ref 32–36)
MCV RBC AUTO: 88.6 FL — SIGNIFICANT CHANGE UP (ref 80–100)
NRBC # BLD: 0 /100 WBCS — SIGNIFICANT CHANGE UP (ref 0–0)
PLATELET # BLD AUTO: 280 K/UL — SIGNIFICANT CHANGE UP (ref 150–400)
POTASSIUM SERPL-MCNC: 3.8 MMOL/L — SIGNIFICANT CHANGE UP (ref 3.5–5.3)
POTASSIUM SERPL-SCNC: 3.8 MMOL/L — SIGNIFICANT CHANGE UP (ref 3.5–5.3)
RBC # BLD: 4.56 M/UL — SIGNIFICANT CHANGE UP (ref 3.8–5.2)
RBC # FLD: 13.8 % — SIGNIFICANT CHANGE UP (ref 10.3–14.5)
SODIUM SERPL-SCNC: 135 MMOL/L — SIGNIFICANT CHANGE UP (ref 135–145)
TOTAL CHOLESTEROL/HDL RATIO MEASUREMENT: 4.5 RATIO — SIGNIFICANT CHANGE UP (ref 3.3–7.1)
TRIGL SERPL-MCNC: 188 MG/DL — HIGH (ref 10–149)
WBC # BLD: 6.55 K/UL — SIGNIFICANT CHANGE UP (ref 3.8–10.5)
WBC # FLD AUTO: 6.55 K/UL — SIGNIFICANT CHANGE UP (ref 3.8–10.5)

## 2020-02-28 PROCEDURE — 99233 SBSQ HOSP IP/OBS HIGH 50: CPT

## 2020-02-28 RX ADMIN — Medication 81 MILLIGRAM(S): at 16:24

## 2020-02-28 RX ADMIN — Medication 30 MILLIGRAM(S): at 16:58

## 2020-02-28 RX ADMIN — Medication 1 MILLIGRAM(S): at 16:24

## 2020-02-28 RX ADMIN — GABAPENTIN 400 MILLIGRAM(S): 400 CAPSULE ORAL at 06:06

## 2020-02-28 RX ADMIN — ENOXAPARIN SODIUM 40 MILLIGRAM(S): 100 INJECTION SUBCUTANEOUS at 16:23

## 2020-02-28 RX ADMIN — GABAPENTIN 400 MILLIGRAM(S): 400 CAPSULE ORAL at 22:17

## 2020-02-28 RX ADMIN — LOSARTAN POTASSIUM 50 MILLIGRAM(S): 100 TABLET, FILM COATED ORAL at 06:06

## 2020-02-28 RX ADMIN — ATORVASTATIN CALCIUM 80 MILLIGRAM(S): 80 TABLET, FILM COATED ORAL at 22:17

## 2020-02-28 RX ADMIN — Medication 12.5 MILLIGRAM(S): at 06:06

## 2020-02-28 NOTE — PROGRESS NOTE ADULT - SUBJECTIVE AND OBJECTIVE BOX
THE PATIENT WAS SEEN AND EXAMINED BY ME WITH THE HOUSESTAFF AND STROKE TEAM DURING MORNING ROUNDS.   HPI:  65y R-handed F with PMH of HTN, HLD, DM, L. sided bell's palsy, vertigo, hypothyroidism S/P radioactive Iodine presenting with s/o HA, subjective facial numbness and facial heaviness, vertical and horizontal diplopia unclear if binocular or monocular, and gait dysequilibrium.  LKN: Unclear precise time, but at least 5 days ago.     Describes feeling numbness in her face as well as HA first.  Describes HA as bifrontal radiating to the back and throbbing in nature waxing from 2-8/10 w/o photophobia or phonophobia.  Endorses associated nausea.  She then developed facial droop on the right and feeling unsteady with her gait requiring touch off from furniture frequently.  She describes dizziness as light-headedness and pre-syncopal sensation.  All of her symptoms are exacerbated by position changes.   She additionally reports having vertigo in the past, but notes this is distinctly different as she does not feel the room spinning sensation she had at initial presentations.     Neurology called for potential of venous sinus thrombosis.  CTV confirmed. (27 Feb 2020 07:54)      SUBJECTIVE: No events overnight.  No new neurologic complaints.      acetaminophen   Tablet .. 650 milliGRAM(s) Oral every 6 hours PRN  aspirin enteric coated 81 milliGRAM(s) Oral daily  atorvastatin 80 milliGRAM(s) Oral at bedtime  clopidogrel Tablet 75 milliGRAM(s) Oral daily  enoxaparin Injectable 40 milliGRAM(s) SubCutaneous daily  folic acid 1 milliGRAM(s) Oral daily  gabapentin 400 milliGRAM(s) Oral two times a day  hydrochlorothiazide 12.5 milliGRAM(s) Oral daily  losartan 50 milliGRAM(s) Oral daily  PARoxetine 30 milliGRAM(s) Oral daily      PHYSICAL EXAM:   Vital Signs Last 24 Hrs  T(C): 36.6 (28 Feb 2020 09:14), Max: 37 (27 Feb 2020 15:34)  T(F): 97.9 (28 Feb 2020 09:14), Max: 98.6 (27 Feb 2020 15:34)  HR: 74 (28 Feb 2020 09:14) (70 - 87)  BP: 154/84 (28 Feb 2020 09:14) (142/79 - 173/88)  BP(mean): --  RR: 18 (28 Feb 2020 09:14) (16 - 18)  SpO2: 98% (28 Feb 2020 09:14) (95% - 98%)    General: No acute distress  HEENT: EOM intact, visual fields full  Abdomen: Soft, nontender, nondistended   Extremities: No edema    NEUROLOGICAL EXAM:  Mental status: Awake, alert, oriented x3, speech fluent, follows commands, no neglect, normal memory   Cranial Nerves: mild, chronic left lower motor neuron facial palsy with synkinesis; no right facial palsy; no right ptosis, no nystagmus, no dysarthria,  tongue midline  Motor exam: Normal tone, no drift, 5/5 RUE, 5/5 RLE, 5/5 LUE, 5/5 LLE, normal fine finger movements.  Sensation: Intact to light touch   Coordination/ Gait: No dysmetria, gait wide based- requires moderate assistance     LABS:                        12.8   6.55  )-----------( 280      ( 28 Feb 2020 06:13 )             40.4    02-28    135  |  100  |  15  ----------------------------<  105<H>  3.8   |  20<L>  |  1.03    Ca    9.3      28 Feb 2020 06:13    TPro  7.9  /  Alb  4.4  /  TBili  0.2  /  DBili  x   /  AST  26  /  ALT  13  /  AlkPhos  125<H>  02-26    Hemoglobin A1C, Whole Blood: 6.0 % (02-28 @ 08:53)      IMAGING: Reviewed by me. THE PATIENT WAS SEEN AND EXAMINED BY ME WITH THE HOUSESTAFF AND STROKE TEAM DURING MORNING ROUNDS.   HPI:  65-year-old right-handed lady with PMHx of HTN, HLD, DM, L. sided bell's palsy, vertigo, hypothyroidism S/P radioactive Iodine first evaluated at [facility] on 2/27/2020 with imbalance. Described feeling numbness in her face as well as HA first.  Described HA as bifrontal radiating to the back and throbbing in nature waxing from 2-8/10 w/o photophobia or phonophobia.  Endorses associated nausea.  She then developed facial droop on the right and feeling unsteady with her gait requiring touch off from furniture frequently.  She described dizziness as light-headedness and pre-syncopal sensation.  All of her symptoms are exacerbated by position changes. She additionally reported having vertigo in the past, but notes this is distinctly different as she does not feel the room spinning sensation she had at initial presentations.     SUBJECTIVE: No events overnight.  No new neurologic complaints.      acetaminophen   Tablet .. 650 milliGRAM(s) Oral every 6 hours PRN  aspirin enteric coated 81 milliGRAM(s) Oral daily  atorvastatin 80 milliGRAM(s) Oral at bedtime  clopidogrel Tablet 75 milliGRAM(s) Oral daily  enoxaparin Injectable 40 milliGRAM(s) SubCutaneous daily  folic acid 1 milliGRAM(s) Oral daily  gabapentin 400 milliGRAM(s) Oral two times a day  hydrochlorothiazide 12.5 milliGRAM(s) Oral daily  losartan 50 milliGRAM(s) Oral daily  PARoxetine 30 milliGRAM(s) Oral daily    PHYSICAL EXAM:   Vital Signs Last 24 Hrs  T(C): 36.6 (28 Feb 2020 09:14), Max: 37 (27 Feb 2020 15:34)  T(F): 97.9 (28 Feb 2020 09:14), Max: 98.6 (27 Feb 2020 15:34)  HR: 74 (28 Feb 2020 09:14) (70 - 87)  BP: 154/84 (28 Feb 2020 09:14) (142/79 - 173/88)  RR: 18 (28 Feb 2020 09:14) (16 - 18)  SpO2: 98% (28 Feb 2020 09:14) (95% - 98%)    General: No acute distress  HEENT: EOM intact, visual fields full  Abdomen: Soft, nontender, nondistended   Extremities: No edema    NEUROLOGICAL EXAM:  Mental status: Awake, alert, oriented x3, speech fluent, follows commands, no neglect, normal memory   Cranial Nerves: mild, chronic left lower motor neuron facial palsy with synkinesis; no right facial palsy; no right ptosis, no nystagmus, no dysarthria,  tongue midline  Motor exam: Normal tone, no drift, 5/5 RUE, 5/5 RLE, 5/5 LUE, 5/5 LLE, normal fine finger movements.  Sensation: Intact to light touch   Coordination/ Gait: No dysmetria, gait wide based- requires moderate assistance     LABS:                        12.8   6.55  )-----------( 280      ( 28 Feb 2020 06:13 )             40.4    02-28    135  |  100  |  15  ----------------------------<  105<H>  3.8   |  20<L>  |  1.03    Ca    9.3      28 Feb 2020 06:13    TPro  7.9  /  Alb  4.4  /  TBili  0.2  /  DBili  x   /  AST  26  /  ALT  13  /  AlkPhos  125<H>  02-26    Hemoglobin A1C, Whole Blood: 6.0 % (02-28 @ 08:53)    IMAGING: Reviewed by me.     MR Head w/wo IV Cont (02.28.2020)  No acute intracranial pathology or abnormal enhancement. Scattered small vessel white matter ischemic changes. No acute infarcts, hemorrhage or mass.    CT Head No Cont (02.27.2020)   No acute intracranial hemorrhage.  Asymmetric prominence of the right superior anastomotic vein, indeterminate. This may represent anatomic variation, but in the appropriate clinical setting, the thrombosis is a consideration. Contrast-enhanced MRV can be considered if clinically warranted. THE PATIENT WAS SEEN AND EXAMINED BY ME WITH THE HOUSESTAFF AND STROKE TEAM DURING MORNING ROUNDS.   HPI:  65-year-old right-handed lady with PMHx of HTN, HLD, DM, L. sided bell's palsy, vertigo, hypothyroidism S/P radioactive Iodine first evaluated at [facility] on 2/27/2020 with imbalance. Described feeling numbness in her face as well as HA first.  Described HA as bifrontal radiating to the back and throbbing in nature waxing from 2-8/10 w/o photophobia or phonophobia.  Endorses associated nausea.  She then developed facial droop on the right and feeling unsteady with her gait requiring touch off from furniture frequently.  She described dizziness as light-headedness and pre-syncopal sensation.  All of her symptoms are exacerbated by position changes. She additionally reported having vertigo in the past, but notes this is distinctly different as she does not feel the room spinning sensation she had at initial presentations.     SUBJECTIVE: No events overnight.  No new neurologic complaints.      acetaminophen   Tablet .. 650 milliGRAM(s) Oral every 6 hours PRN  aspirin enteric coated 81 milliGRAM(s) Oral daily  atorvastatin 80 milliGRAM(s) Oral at bedtime  clopidogrel Tablet 75 milliGRAM(s) Oral daily  enoxaparin Injectable 40 milliGRAM(s) SubCutaneous daily  folic acid 1 milliGRAM(s) Oral daily  gabapentin 400 milliGRAM(s) Oral two times a day  hydrochlorothiazide 12.5 milliGRAM(s) Oral daily  losartan 50 milliGRAM(s) Oral daily  PARoxetine 30 milliGRAM(s) Oral daily    PHYSICAL EXAM:   Vital Signs Last 24 Hrs  T(C): 36.6 (28 Feb 2020 09:14), Max: 37 (27 Feb 2020 15:34)  T(F): 97.9 (28 Feb 2020 09:14), Max: 98.6 (27 Feb 2020 15:34)  HR: 74 (28 Feb 2020 09:14) (70 - 87)  BP: 154/84 (28 Feb 2020 09:14) (142/79 - 173/88)  RR: 18 (28 Feb 2020 09:14) (16 - 18)  SpO2: 98% (28 Feb 2020 09:14) (95% - 98%)    General: No acute distress  HEENT: EOM intact, visual fields full  Abdomen: Soft, nontender, nondistended   Extremities: No edema    NEUROLOGICAL EXAM:  Mental status: Awake, alert, oriented x3, speech fluent, follows commands, no neglect, normal memory   Cranial Nerves: mild, chronic left lower motor neuron facial palsy with synkinesis; no right facial palsy; no right ptosis, no nystagmus, no dysarthria,  tongue midline  Motor exam: Normal tone, no drift, 5/5 RUE, 5/5 RLE, 5/5 LUE, 5/5 LLE, normal fine finger movements.  Sensation: Intact to light touch   Coordination/ Gait: No dysmetria, gait wide based and unsteady- requires moderate assistance of 1 person    LABS:                        12.8   6.55  )-----------( 280      ( 28 Feb 2020 06:13 )             40.4    02-28    135  |  100  |  15  ----------------------------<  105<H>  3.8   |  20<L>  |  1.03    Ca    9.3      28 Feb 2020 06:13    TPro  7.9  /  Alb  4.4  /  TBili  0.2  /  DBili  x   /  AST  26  /  ALT  13  /  AlkPhos  125<H>  02-26    Hemoglobin A1C, Whole Blood: 6.0 % (02-28 @ 08:53)    IMAGING: Reviewed by me.     MR Head w/wo IV Cont (02.28.2020)  No acute intracranial pathology or abnormal enhancement. Scattered small vessel white matter ischemic changes. No acute infarcts, hemorrhage or mass.    CT Head No Cont (02.27.2020)   No acute intracranial hemorrhage.  Asymmetric prominence of the right superior anastomotic vein, indeterminate. This may represent anatomic variation, but in the appropriate clinical setting, the thrombosis is a consideration. Contrast-enhanced MRV can be considered if clinically warranted.

## 2020-02-28 NOTE — PROGRESS NOTE ADULT - ASSESSMENT
65-year-old right-handed lady first evaluated at [facility] on 2/27/2020 with imbalance.     Impression.  She has a history of vertigo going back about 10 years prior to admission.  About 1 week prior to admission she had her usual vertigo lasting several days.  Also about 1 week prior to admission, she had bloody diarrhea.  On 2/26/2020, she noted imbalance without actual vertigo, "bilateral" facial numbness, a throbbing headache, nausea, vertical and horizontal (perhaps oblique?)  Diplopia, and possibly dysarthria.  Neurologic exam is remarkable only for a chronic left Bell's palsy.  Her clinical presentation is ambiguous, but could possibly localize to the brainstem, perhaps the ozzy.  Etiology peripheral nerve Guillain-Barré variant.    ASSESSMENT:     NEURO: Continue close monitoring for neurologic deterioration, permissive HTN, titrate statin to LDL goal less than 70, MRI Brain w/o, MRA Head w/o and Neck w/contrast. Physical therapy/OT/Speech eval/treatment.     ANTITHROMBOTIC THERAPY:     PULMONARY: CXR clear, protecting airway, saturating well     CARDIOVASCULAR: check TTE, cardiac monitoring                              SBP goal:     GASTROINTESTINAL:  dysphagia screen       Diet:     RENAL: BUN/Cr stable, good urine output      Na Goal: Greater than 135     [place]:    HEMATOLOGY: H/H stable, Platelets normal      DVT ppx: Heparin s.c [] LMWH []     ID: afebrile, no leukocytosis     OTHER:     DISPOSITION: Rehab or home depending on PT eval once stable and workup is complete    CORE MEASURES:        Admission NIHSS: 4     TPA: NO      LDL/HDL: 187/65     Depression Screen: 0     Statin Therapy: NOT INDICATED     Dysphagia Screen: PASS     Smoking NO      Afib  NO     Stroke Education YES    Obtain screening lower extremity venous ultrasound in patients who meet 1 or more of the following criteria as patient is high risk for DVT/PE on admission:   [] History of DVT/PE  []Hypercoagulable states (Factor V Leiden, Cancer, OCP, etc. )  []Prolonged immobility (hemiplegia/hemiparesis/post operative or any other extended immobilization)  [] Transferred from outside facility (Rehab or Long term care)  [] Age </= to 50 65-year-old right-handed lady first evaluated at [facility] on 2/27/2020 with imbalance.     Impression.  She has a history of vertigo going back about 10 years prior to admission.  About 1 week prior to admission she had her usual vertigo lasting several days.  Also about 1 week prior to admission, she had bloody diarrhea.  On 2/26/2020, she noted imbalance without actual vertigo, "bilateral" facial numbness, a throbbing headache, nausea, vertical and horizontal (perhaps oblique?)  Diplopia, and possibly dysarthria.  Neurologic exam is remarkable only for a chronic left Bell's palsy.  Her clinical presentation is ambiguous, but could possibly localize to the brainstem, perhaps the ozzy.  Etiology peripheral nerve myelopathy such as  Guillain-Barré variant.    NEURO: Transfer to general neurology, Continue close monitoring for neurologic deterioration, continue on home statin, MRI Brain w/o contrast without evidence of infarct. Physical therapy/OT pending    ANTITHROMBOTIC THERAPY: ASA (Plavix discontinued)     PULMONARY: CXR clear, protecting airway, saturating well     CARDIOVASCULAR: TTE showed Mild mitral regurgitation. Mild diastolic dysfunction (Stage I), no evidence of a patent foramen ovale. cardiac monitoring not needed.                             SBP goal: normotension    GASTROINTESTINAL:  dysphagia screen passed     Diet: Regular    RENAL: BUN/Cr without acute change, good urine output      Na Goal: Greater than 135     [place]: No    HEMATOLOGY: H/H without acute change, Platelets 280     DVT ppx:  LMWH    ID: afebrile, no leukocytosis     OTHER: Plan endorsed to patient and family member at bedside, all questions and concerns addressed.    DISPOSITION: Rehab or home depending on PT eval once stable and workup is complete    CORE MEASURES:        Admission NIHSS: 4     TPA: NO      LDL/HDL: 187/65     Depression Screen: 0     Statin Therapy: NOT INDICATED     Dysphagia Screen: PASS     Smoking NO      Afib  NO     Stroke Education YES    Obtain screening lower extremity venous ultrasound in patients who meet 1 or more of the following criteria as patient is high risk for DVT/PE on admission:   [] History of DVT/PE  []Hypercoagulable states (Factor V Leiden, Cancer, OCP, etc. )  []Prolonged immobility (hemiplegia/hemiparesis/post operative or any other extended immobilization)  [] Transferred from outside facility (Rehab or Long term care)  [] Age </= to 50 65-year-old right-handed lady first evaluated at [facility] on 2/27/2020 with imbalance.     Impression.  She has a history of vertigo going back about 10 years prior to admission.  About 1 week prior to admission she had her usual vertigo lasting several days.  Also about 1 week prior to admission, she had bloody diarrhea.  On 2/26/2020, she noted imbalance without actual vertigo, "bilateral" facial numbness, a throbbing headache, nausea, vertical and horizontal (perhaps oblique?)  Diplopia, and possibly dysarthria.  Neurologic exam is remarkable only for a chronic left Bell's palsy and postural instability.  Her clinical presentation is ambiguous, but could possibly localize to the brainstem, perhaps the ozzy, not confirmed on MRI.  Differential diagnosis includes PNS process such as  Guillain-Barré variant. She does not seem to have had a stroke.     NEURO: Transfer to general neurology, Continue close monitoring for neurologic deterioration, continue on home statin, MRI Brain w/o contrast without evidence of infarct. Physical therapy/OT pending    ANTITHROMBOTIC THERAPY: ASA (Plavix discontinued)     PULMONARY: CXR clear, protecting airway, saturating well     CARDIOVASCULAR: TTE showed Mild mitral regurgitation. Mild diastolic dysfunction (Stage I), no evidence of a patent foramen ovale. cardiac monitoring not needed.                             SBP goal: normotension    GASTROINTESTINAL:  dysphagia screen passed     Diet: Regular    RENAL: BUN/Cr without acute change, good urine output      Na Goal: Greater than 135     [place]: No    HEMATOLOGY: H/H without acute change, Platelets 280     DVT ppx:  LMWH    ID: afebrile, no leukocytosis     OTHER: Plan endorsed to patient and family member at bedside, all questions and concerns addressed.    DISPOSITION: Rehab or home depending on PT eval once stable and workup is complete    CORE MEASURES:        Admission NIHSS: 4     TPA: NO      LDL/HDL: 187/65     Depression Screen: 0     Statin Therapy: NOT INDICATED     Dysphagia Screen: PASS     Smoking NO      Afib  NO     Stroke Education YES    Obtain screening lower extremity venous ultrasound in patients who meet 1 or more of the following criteria as patient is high risk for DVT/PE on admission:   [] History of DVT/PE  []Hypercoagulable states (Factor V Leiden, Cancer, OCP, etc. )  []Prolonged immobility (hemiplegia/hemiparesis/post operative or any other extended immobilization)  [] Transferred from outside facility (Rehab or Long term care)  [] Age </= to 50

## 2020-02-28 NOTE — PATIENT PROFILE ADULT - NSASFUNCLEVELADLTOILET_GEN_A_NUR
Group Topic:  Symptom Management    Date: 8/19/2019  Start Time: 11:00 AM  End Time: 12:00 PM    Focus: Anger  Number in attendance: 9    A video was presented on anger. Pt's also participated in a worksheet activity in which they challenged trigger thoughts and logged anger inducing situations.     Patient Response: Good eye contact and Interactive     Pt was attentive and engaged. She participated in the worksheet activity and spoke of an argument with her daughter.    EMIGDIO Bennett    
Group Topic: BH Activity Group    Date: 8/19/2019  Start Time:  2:40 PM  End Time:  3:30 PM    Focus: Opposite action  Number in attendance: 7    A presentation was given on how to cope with problematic emotions (e.g. Anxiety, anger) by deliberately engaging in actions that oppose the action urges of those emotions. Pt shared examples of when they have successfully used opposite action in the past and explored emotions they can try to better manage through use of the skill.      Patient Response: Pt was attentive to group discussion, asked appropriate questions, and could be seen taking notes. Pt spoke about how anger suppression is actually something she struggles with and explored what to do with anger that is justified by facts. Pt also stated she wants to continue to acknowledge her emotions in the moment and take back control by deciding if it's an emotion she wants to express or \"just let it go.\"    Mihaela Mejia, LPC    
Group Topic: BH Coping Skills Education    Date: 8/19/2019  Start Time: 10:00 AM  End Time: 11:00 AM    Focus: Anger   Number in attendance: 9    This presentation discusses the effects of anger on health and in relationships. Discussion also included how to identify trigger thoughts and healthy coping skills to manage anger.       Attendance: Present  Patient Response: Good eye contact and Interactive     Pt was attentive and took notes. She related the topic discussed to her own mental health symptoms and shared examples of the payoffs of anger.     EMIGDIO Bennett    
Group Topic: BH Process Group     Date: 8/19/2019  Start Time:  1:00 PM  End Time:  2:30 PM    Focus: Recovery   Number in attendance: 8    Patients were given the opportunity to share individually about goals, current stressors and therapeutic assignments. Group and therapist feedback is welcomed and maladaptive skills are challenged with coping options.         Attendance: Present  Response Communication: Appropriate topic  MoodAffect: Appropriate to group and Sad/Depressed  Behavior/Socialization: Provided feedback to peers  Thought Process: Appropriate and Tangential  Patient Response: Attentive, Good eye contact and Interactive     Pt shared that she had a good weekend and said she completed some of her goals but not all of them. She said she has been slowly making progress on house tasks that have been a stressor for her. She discussed that her  wants to work on house hold tasks \"one thing at a time\" and she feels like she can get more accomplished. She also said she was able to open up to her friend about her struggle with mental illness, and indicated it feels good to talk about this. Pt stated that her \"largest concern\" is questioning if she is prescribed to the correct medication. She said her medication is helping her anxiety symptoms but is wondering if she has \"addrenal fatigue\". She said she is worried her medication is causing her to feel like she has no \"zest for life\" and \"flat\". Pt said she has a gynocologist appointment tomorrow and plans to ask about this. Pt's goals are to go out to dinner with her mom and exercise.     EMIGDIO Bennett    
3 = assistive equipment and person

## 2020-02-28 NOTE — PROGRESS NOTE ADULT - SUBJECTIVE AND OBJECTIVE BOX
Subjective: Patient seen and examined. No new events except as noted.     REVIEW OF SYSTEMS:    CONSTITUTIONAL:+ weakness, fevers or chills  EYES/ENT: No visual changes;  No vertigo or throat pain   NECK: No pain or stiffness  RESPIRATORY: No cough, wheezing, hemoptysis; No shortness of breath  CARDIOVASCULAR: No chest pain or palpitations  GASTROINTESTINAL: No abdominal or epigastric pain. No nausea, vomiting, or hematemesis; No diarrhea or constipation. No melena or hematochezia.  GENITOURINARY: No dysuria, frequency or hematuria  NEUROLOGICAL: No numbness or weakness  SKIN: No itching, burning, rashes, or lesions   All other review of systems is negative unless indicated above.    MEDICATIONS:  MEDICATIONS  (STANDING):  aspirin enteric coated 81 milliGRAM(s) Oral daily  atorvastatin 80 milliGRAM(s) Oral at bedtime  enoxaparin Injectable 40 milliGRAM(s) SubCutaneous daily  folic acid 1 milliGRAM(s) Oral daily  gabapentin 400 milliGRAM(s) Oral two times a day  hydrochlorothiazide 12.5 milliGRAM(s) Oral daily  losartan 50 milliGRAM(s) Oral daily  PARoxetine 30 milliGRAM(s) Oral daily      PHYSICAL EXAM:  T(C): 36.6 (02-28-20 @ 09:14), Max: 37 (02-27-20 @ 15:34)  HR: 74 (02-28-20 @ 09:14) (70 - 87)  BP: 154/84 (02-28-20 @ 09:14) (143/83 - 173/88)  RR: 18 (02-28-20 @ 09:14) (17 - 18)  SpO2: 98% (02-28-20 @ 09:14) (95% - 98%)  Wt(kg): --  I&O's Summary        Appearance: Normal	  HEENT:   Normal oral mucosa, PERRL, EOMI	  Lymphatic: No lymphadenopathy , no edema  Cardiovascular: Normal S1 S2, No JVD, No murmurs , Peripheral pulses palpable 2+ bilaterally  Respiratory: Lungs clear to auscultation, normal effort 	  Gastrointestinal:  Soft, Non-tender, + BS	  Skin: No rashes, No ecchymoses, No cyanosis, warm to touch  Musculoskeletal: Normal range of motion, normal strength  Psychiatry:  Mood & affect appropriate  Ext: No edema      LABS:    CARDIAC MARKERS:                                12.8   6.55  )-----------( 280      ( 28 Feb 2020 06:13 )             40.4     02-28    135  |  100  |  15  ----------------------------<  105<H>  3.8   |  20<L>  |  1.03    Ca    9.3      28 Feb 2020 06:13    TPro  7.9  /  Alb  4.4  /  TBili  0.2  /  DBili  x   /  AST  26  /  ALT  13  /  AlkPhos  125<H>  02-26    proBNP:   Lipid Profile:   HgA1c: Hemoglobin A1C, Whole Blood: 6.0 % (02-28 @ 08:53)    TSH:     1          TELEMETRY: 	  SR  ECG:  	  RADIOLOGY:   DIAGNOSTIC TESTING:  [ ] Echocardiogram:  < from: TTE with Doppler (w/Cont) (02.27.20 @ 15:11) >    Patient name: VAL WOMACK  YOB: 1955   Age: 65 (F)   MR#: [id number]  Study Date: 2/27/2020  [facility]  Study quality: Technically fair  Referring Physician: Richard B Libman, MD  Blood Pressure: 173/88 mmHg  Height: 163 cm  Weight: 93 kg  BSA: 2 m2  ------------------------------------------------------------------------  PROCEDURE: Transthoracic echocardiogram with 2-D, M-Mode  and complete spectral and color flow Doppler. Patient was  injected with 10 cc's of aerosolized saline. Patient was  injected with 10 cc's of aerosolized saline.  INDICATION: Transient cerebral ischemic attack, unspecified  (G45.9)  ------------------------------------------------------------------------  Dimensions:    Normal Values:  LA:     2.4    2.0 - 4.0 cm  Ao:     3.0    2.0 - 3.8 cm  SEPTUM: 1.4    0.6 - 1.2 cm  PWT:    0.9    0.6 - 1.1 cm  LVIDd:  3.9    3.0 - 5.6 cm  LVIDs:  1.9    1.8 - 4.0 cm  Derived variables:  LVMI: 77 g/m2  RWT: 0.46  Fractional short: 52 %  EF (Visual Estimate): 75-80 %  ------------------------------------------------------------------------  Observations:  Mitral Valve: Mitral valve not well visualized, probably  normal. Mild mitral regurgitation.  Aortic Valve/Aorta: Aortic valve not well visualized. No  aortic valve regurgitation seen.  Aortic Root: 3.1 cm.  Left Atrium: Normal left atrium.  Left Ventricle: Hyperdynamic left ventricular systolic  function. Asymmetric septal hypertrophy. Mild diastolic  dysfunction (Stage I).  Right Heart: Normal right atrium. The right ventricle is  not well visualized; grossly normal right ventricular  systolic function. Normal tricuspid valve. Pulmonic valve  not well visualized, probably normal. No pulmonic  regurgitation.  Pericardium/Pleura: Normal pericardium with no pericardial  effusion.  Hemodynamic: Agitated saline injection and color flow  Doppler demonstrates no evidence of a patent foramen ovale.  ------------------------------------------------------------------------  Conclusions:  1. Mitral valve not well visualized, probably normal. Mild  mitral regurgitation.  2. Aortic valve not well visualized. No aortic valve  regurgitation seen.  3. Asymmetric septal hypertrophy.  4. Hyperdynamic left ventricular systolic function.  5. Mild diastolic dysfunction (Stage I).  6. The right ventricle is not well visualized; grossly  normal right ventricular systolic function.  7. Agitated saline injection and color flow Doppler  demonstrates no evidence of a patent foramen ovale.  *** No previous Echo exam.  ------------------------------------------------------------------------  Confirmed on  2/27/2020 - 17:17:32 by Maxi Smith M.D.  ------------------------------------------------------------------------    < end of copied text >    [ ]  Catheterization:  [ ] Stress Test:    OTHER:

## 2020-02-28 NOTE — PROGRESS NOTE ADULT - ASSESSMENT
65y R-handed F with PMH of HTN, HLD, DM, L. sided bell's palsy, vertigo, hypothyroidism presenting with s/o HA, subjective facial numbness and facial heaviness, vertical and horizontal diplopia unclear if binocular or monocular, and gait dysequilibrium.    On exam, she has a chronic L. facial droop at nasolabial fold, synkinesis of lip, decreased hearing on left to tuning fork, Moderate R. facial droop including frontalis, and significant gait instabilty requiring 2 person assist to stand.

## 2020-02-28 NOTE — PATIENT PROFILE ADULT - NSPROIMPLANTSMEDDEV_GEN_A_NUR
cardiologist was not available. His wife CORA Resendez., CCC-SLP, [facility]  Speech-Language Pathologist  8/6/2018    None

## 2020-02-29 DIAGNOSIS — R27.0 ATAXIA, UNSPECIFIED: ICD-10-CM

## 2020-02-29 PROCEDURE — 99233 SBSQ HOSP IP/OBS HIGH 50: CPT | Mod: GC

## 2020-02-29 RX ADMIN — Medication 650 MILLIGRAM(S): at 05:39

## 2020-02-29 RX ADMIN — GABAPENTIN 400 MILLIGRAM(S): 400 CAPSULE ORAL at 17:23

## 2020-02-29 RX ADMIN — Medication 650 MILLIGRAM(S): at 17:23

## 2020-02-29 RX ADMIN — ENOXAPARIN SODIUM 40 MILLIGRAM(S): 100 INJECTION SUBCUTANEOUS at 11:21

## 2020-02-29 RX ADMIN — Medication 650 MILLIGRAM(S): at 11:24

## 2020-02-29 RX ADMIN — Medication 650 MILLIGRAM(S): at 11:54

## 2020-02-29 RX ADMIN — Medication 30 MILLIGRAM(S): at 11:21

## 2020-02-29 RX ADMIN — Medication 1 MILLIGRAM(S): at 11:21

## 2020-02-29 RX ADMIN — Medication 650 MILLIGRAM(S): at 22:25

## 2020-02-29 RX ADMIN — Medication 650 MILLIGRAM(S): at 17:53

## 2020-02-29 RX ADMIN — Medication 650 MILLIGRAM(S): at 05:09

## 2020-02-29 RX ADMIN — ATORVASTATIN CALCIUM 80 MILLIGRAM(S): 80 TABLET, FILM COATED ORAL at 22:25

## 2020-02-29 RX ADMIN — GABAPENTIN 400 MILLIGRAM(S): 400 CAPSULE ORAL at 05:09

## 2020-02-29 RX ADMIN — Medication 12.5 MILLIGRAM(S): at 05:09

## 2020-02-29 RX ADMIN — LOSARTAN POTASSIUM 50 MILLIGRAM(S): 100 TABLET, FILM COATED ORAL at 05:09

## 2020-02-29 RX ADMIN — Medication 650 MILLIGRAM(S): at 22:55

## 2020-02-29 RX ADMIN — Medication 81 MILLIGRAM(S): at 11:21

## 2020-02-29 NOTE — PROGRESS NOTE ADULT - SUBJECTIVE AND OBJECTIVE BOX
Subjective: Patient seen and examined. No new events except as noted.   resting comfortably     REVIEW OF SYSTEMS:    CONSTITUTIONAL:+ weakness, fevers or chills  EYES/ENT: No visual changes;  No vertigo or throat pain   NECK: No pain or stiffness  RESPIRATORY: No cough, wheezing, hemoptysis; No shortness of breath  CARDIOVASCULAR: No chest pain or palpitations  GASTROINTESTINAL: No abdominal or epigastric pain. No nausea, vomiting, or hematemesis; No diarrhea or constipation. No melena or hematochezia.  GENITOURINARY: No dysuria, frequency or hematuria  NEUROLOGICAL: + numbness or weakness  SKIN: No itching, burning, rashes, or lesions   All other review of systems is negative unless indicated above.    MEDICATIONS:  MEDICATIONS  (STANDING):  aspirin enteric coated 81 milliGRAM(s) Oral daily  atorvastatin 80 milliGRAM(s) Oral at bedtime  enoxaparin Injectable 40 milliGRAM(s) SubCutaneous daily  folic acid 1 milliGRAM(s) Oral daily  gabapentin 400 milliGRAM(s) Oral two times a day  hydrochlorothiazide 12.5 milliGRAM(s) Oral daily  losartan 50 milliGRAM(s) Oral daily  PARoxetine 30 milliGRAM(s) Oral daily      PHYSICAL EXAM:  T(C): 36.5 (02-29-20 @ 17:13), Max: 36.7 (02-28-20 @ 21:19)  HR: 69 (02-29-20 @ 17:13) (69 - 77)  BP: 138/87 (02-29-20 @ 17:13) (132/78 - 148/85)  RR: 18 (02-29-20 @ 17:13) (17 - 18)  SpO2: 95% (02-29-20 @ 17:13) (95% - 100%)  Wt(kg): --  I&O's Summary    28 Feb 2020 07:01  -  29 Feb 2020 07:00  --------------------------------------------------------  IN: 720 mL / OUT: 1450 mL / NET: -730 mL    29 Feb 2020 07:01  -  29 Feb 2020 20:57  --------------------------------------------------------  IN: 835 mL / OUT: 0 mL / NET: 835 mL        Appearance: Normal	  HEENT:   Normal oral mucosa, PERRL, EOMI	  Lymphatic: No lymphadenopathy , no edema  Cardiovascular: Normal S1 S2, No JVD, No murmurs , Peripheral pulses palpable 2+ bilaterally  Respiratory: Lungs clear to auscultation, normal effort 	  Gastrointestinal:  Soft, Non-tender, + BS	  Skin: No rashes, No ecchymoses, No cyanosis, warm to touch  Musculoskeletal: Normal range of motion, normal strength  Psychiatry:  Mood & affect appropriate  Ext: No edema    LABS:    CARDIAC MARKERS:                                12.8   6.55  )-----------( 280      ( 28 Feb 2020 06:13 )             40.4     02-28    135  |  100  |  15  ----------------------------<  105<H>  3.8   |  20<L>  |  1.03    Ca    9.3      28 Feb 2020 06:13      proBNP:   Lipid Profile:   HgA1c:   TSH:     1          TELEMETRY: 	    ECG:  	  RADIOLOGY:   DIAGNOSTIC TESTING:  [ ] Echocardiogram:  [ ]  Catheterization:  [ ] Stress Test:    OTHER:

## 2020-02-29 NOTE — PROVIDER CONTACT NOTE (OTHER) - ASSESSMENT
Neuro check w/ no significant changes, unable to see number of fingers to right upper visual field and difficulty seeing fingers to lower visual fields.

## 2020-02-29 NOTE — CHART NOTE - NSCHARTNOTEFT_GEN_A_CORE
NEUROLOGY ATTENDING: JOEL     CC: WEAKNESS, ESPECIALLY ON THE LEFT    PATIENT SEEN & EXAMINED WITH NEUROLOGY RESIDENTS  SUNRISE RECORDS REVIEWED  LABS REVIEWED  IMAGING REVIEWED  CASE DISCUSSED WITH STROKE ATTENDING (LIBMAN)    Briefly, 64 yo RH woman Austrian speaking (who asks that her daughter interpret, which her daughter does for us today), who reports migraines in the remote past (as a teenager), but these got better as she got older. She now has about six headaches per month total. However, for the last one month, she has had increasing difficulty going up and down the stairs. She also feels that her speaking is off (dysarthria, not aphasia) and that she has odd numbness involving the back of her head and her right wrist and most of her right leg. She is very worried about these symptoms. She takes Aspirin, lipitor, BP mds, (losartan), Paxil, antivert, and neurontin 400mg daily. She reports that the Neurontin was started by her PCP for fibromylagia. She has a blood pressure cuff at home (her daughter purchased it for her) and her BP ranges from 97/60 to 200/97. She feels a bad headache when her pressure is high. She is asymptomatic when it is low.    She was initially admitted to CVA service as she has risk factors for CVA: HTN, hyperlipidemia, and borderline diabetes Mellitus (serum glucose=137 and Hga1c was 6). When imaging showed no CVA and her symptoms did not appear to be related to cerebral dysfunction on exam, she was transferred to general neurology.    There are no complaints of nausea/vomiting, visual changes or any signs of increased ICP.   No seizures, nor any seizure-like symptomatology.  There are no other cranial nerve complaints: no double-vision, no blurry vision, no facial asymmetry, no trouble swallowing, no hearing loss. HOWEVER, SHE DOES HAVE CRANIAL (AND LIMB) NUMBNESS    EXAMINATION  CONSTITUTIONAL	Healthy appearing, well-groomed, and without deformities.  MENTAL STATUS	Awake and oriented to person, place and date/time  	Normal attention span & concentration   	Nonaphasic speech – normal naming, repetition, and comprehension.  	Recent and remote memory intact.  	Normal fund of knowledge, including current events; Provides full history.  CRANIAL NERVES	II: Visual fields are full.   	III, IV, and VI: PERRLA. EOMs are normal.  	V: Facial sensation is normal bilaterally  	VII: Facial strength is normal bilaterally.  	VIII: Hearing is intact.  	IX, X: Palate is midline and gag intact.  	XI: SCM and trapezius have normal strength bilaterally.  	XII: Tongue is midline and there is no atrophy or fasciculations.  MOTOR	No drift. Muscle tone: no evidence of rigidity or resistance. SOME GIVE WAY WEAKNESS.  Muscle strength: arms and legs, proximal and distal, flexors and extensors are normal. No atrophy or fasciculations.  SENSATION	Normal in arms, legs, and trunk.  COORDINATION	Rapid alternating movements are normal in the upper and lower extremities. Finger/nose & heel/knee/shin are normal bilaterally.  REFLEXES	All present and normal at biceps, triceps, and brachioradialis bilaterally. Knees and ankle jerks are normal bilaterally as well. Toes are downgoing.  CARDIOVASCULAR	No peripheral edema.  HEENT	Atraumatic.    IMAGING  I personally reviewed NEUROimaging dated 2/27/20 (MRI). There is no CVA, there is no old encephaloimalacia. There is no T2 FLAIR change c/w MS, it is a normal study.    IMPRESSION  WEAKNESS -- Would pursue CSF study to look for AIDP (Carpenter Estrada?) as etiology of her complaints. Her normal exam and intact reflexes are reassuring, however, occasionally carpenter estrada (and other AIDP syndromes) have prominent ataxic gait and intact DTRs.  WORKUP -- systemic antibodies for anti-Gq1b and ACHr to be sent. CSF under fluoroscopy as she is obese. CSF to be sent for cell count, protein/glucose, gram stain/culture, viral PCR.  PT EDUCATION -- Provided reassurance.  DISPO -- PT/OT evaluation requested. Will need to stay until after CSF evaluation.    Total time spent was 36 minutes, of which half in counselling and coordination of care.

## 2020-02-29 NOTE — PROGRESS NOTE ADULT - SUBJECTIVE AND OBJECTIVE BOX
MRN-[id number]  Patient is a 65y old  Female who presents with a chief complaint of R/O stroke (29 Feb 2020 23:59)    HPI:  65y R-handed F with PMH of HTN, HLD, DM, L. sided bell's palsy, vertigo, hypothyroidism S/P radioactive Iodine presenting with s/o HA, subjective facial numbness and facial heaviness, vertical and horizontal diplopia unclear if binocular or monocular, and gait dysequilibrium.  LKN: Unclear precise time, but at least 5 days ago.     Describes feeling numbness in her face as well as HA first.  Describes HA as bifrontal radiating to the back and throbbing in nature waxing from 2-8/10 w/o photophobia or phonophobia.  Endorses associated nausea.  She then developed facial droop on the right and feeling unsteady with her gait requiring touch off from furniture frequently.  She describes dizziness as light-headedness and pre-syncopal sensation.  All of her symptoms are exacerbated by position changes.   She additionally reports having vertigo in the past, but notes this is distinctly different as she does not feel the room spinning sensation she had at initial presentations.     Neurology called for potential of venous sinus thrombosis.  CTV confirmed. (27 Feb 2020 07:54)    Interval History: no new events.       PAST MEDICAL & SURGICAL HISTORY:  Miscarriage: 1 spontaneous 1st trimester miscarriage  Hypothyroid: S/P Radioactive iodine  Vertigo  H/O Bell's palsy: Left sided  HTN (hypertension)  HLD (hyperlipidemia)  History of hysteroscopy  S/P appendectomy    FAMILY HISTORY:  Family history of miscarriage: Mother had two miscarriages, not sure what trimester    Social Hx:  Nonsmoker, no drug or alcohol use    Home Medications:  Ambien 5 mg oral tablet: 1 tab(s) orally once a day (at bedtime) (27 Feb 2020 11:32)  cyclobenzaprine 10 mg oral tablet: 1 tab(s) orally once a day (at bedtime) (27 Feb 2020 11:32)  folic acid 1 mg oral tablet: 1 tab(s) orally once a day (27 Feb 2020 11:32)  gabapentin 400 mg oral capsule: 1 cap(s) orally 2 times a day (27 Feb 2020 11:32)  hydroCHLOROthiazide 12.5 mg oral capsule: 1 cap(s) orally once a day (27 Feb 2020 11:32)  LORazepam 0.5 mg oral tablet: 1 tab(s) orally once a day (at bedtime), As Needed for anxiety/sleep (27 Feb 2020 11:32)  losartan 50 mg oral tablet: 1 tab(s) orally once a day (27 Feb 2020 11:32)  Paxil 30 mg oral tablet: 1 tab(s) orally once a day (27 Feb 2020 11:32)  risperiDONE 2 mg oral tablet: 1 tab(s) orally once a day (27 Feb 2020 11:32)    MEDICATIONS  (STANDING):  aspirin enteric coated 81 milliGRAM(s) Oral daily  atorvastatin 80 milliGRAM(s) Oral at bedtime  enoxaparin Injectable 40 milliGRAM(s) SubCutaneous daily  folic acid 1 milliGRAM(s) Oral daily  gabapentin 400 milliGRAM(s) Oral two times a day  hydrochlorothiazide 12.5 milliGRAM(s) Oral daily  losartan 50 milliGRAM(s) Oral daily  PARoxetine 30 milliGRAM(s) Oral daily    MEDICATIONS  (PRN):  acetaminophen   Tablet .. 650 milliGRAM(s) Oral every 6 hours PRN Mild Pain (1 - 3), Moderate Pain (4 - 6), Severe Pain (7 - 10)    Allergies  hydralazine-like drugs (Other)      REVIEW OF SYSTEMS    ROS: Pertinent positives in HPI, all other ROS were reviewed and are negative.      Vital Signs Last 24 Hrs  T(C): 36.7 (29 Feb 2020 21:13), Max: 36.7 (29 Feb 2020 13:23)  T(F): 98 (29 Feb 2020 21:13), Max: 98.1 (29 Feb 2020 13:23)  HR: 69 (29 Feb 2020 21:13) (69 - 77)  BP: 133/80 (29 Feb 2020 21:13) (132/78 - 148/85)  BP(mean): --  RR: 18 (29 Feb 2020 21:13) (17 - 18)  SpO2: 97% (29 Feb 2020 21:13) (95% - 100%)    GENERAL EXAM:  Constitutional: awake and alert. NAD  HEENT: PERRLA, EOMI  Neck: Supple  Respiratory: Breath sounds are clear bilaterally  Cardiovascular: S1 and S2, regular / irregular rhythm  Gastrointestinal: soft, nontender  Extremities: no edema, no cyanosis  Vascular: no carotid bruits  Musculoskeletal: no joint swelling/tenderness, no abnormal movements  Skin: no rashes    NEUROLOGICAL EXAM:  MS: AAOX3, fluent, attends b/l; recent and remote memory intact; normal attention, language.   CN: VFF, EOMI, no facial asymmetry.  Motor: Strength: giveway weakness. reflexes 2+ throughout Plantar flex b/l.  Gait: 2 person assist, walk with wide based gait.        Labs:   cbc                      12.8   6.55  )-----------( 280      ( 28 Feb 2020 06:13 )             40.4     [id number]    135  |  100  |  15  ----------------------------<  105<H>  3.8   |  20<L>  |  1.03    Ca    9.3      28 Feb 2020 06:13      Coags  [id number] Chol 289<H> <H> HDL 65 Trig 188<H>  [id number] 6.0

## 2020-02-29 NOTE — PROVIDER CONTACT NOTE (OTHER) - SITUATION
Pt c/o occipital headache now radiating down midline to frontal lobe w/ increased pain when opening eyes associated w/ "cloudy halos" around fingers when asked to count.

## 2020-02-29 NOTE — PROGRESS NOTE ADULT - ASSESSMENT
Impression: Etiology of her symptoms is unclear. It is possible that she may have AIDP, though her presentation is unusual for AIDP, and potentially myasthenia gravis. Would be important to further evaluate.     Plan:  -plan for LP for CSF cell count, protein, glucose, gram stain/culture, viral PCR. will need LP under fluoro  -send anti-Gq1b antibody  -send AChR antibodies - binding, blocking, modulating  -PT/OT

## 2020-03-01 LAB
ALBUMIN SERPL ELPH-MCNC: 4.2 G/DL — SIGNIFICANT CHANGE UP (ref 3.3–5)
ALP SERPL-CCNC: 117 U/L — SIGNIFICANT CHANGE UP (ref 40–120)
ALT FLD-CCNC: 16 U/L — SIGNIFICANT CHANGE UP (ref 10–45)
ANION GAP SERPL CALC-SCNC: 14 MMOL/L — SIGNIFICANT CHANGE UP (ref 5–17)
AST SERPL-CCNC: 26 U/L — SIGNIFICANT CHANGE UP (ref 10–40)
BILIRUB SERPL-MCNC: 0.4 MG/DL — SIGNIFICANT CHANGE UP (ref 0.2–1.2)
BUN SERPL-MCNC: 19 MG/DL — SIGNIFICANT CHANGE UP (ref 7–23)
CALCIUM SERPL-MCNC: 9.3 MG/DL — SIGNIFICANT CHANGE UP (ref 8.4–10.5)
CHLORIDE SERPL-SCNC: 98 MMOL/L — SIGNIFICANT CHANGE UP (ref 96–108)
CO2 SERPL-SCNC: 26 MMOL/L — SIGNIFICANT CHANGE UP (ref 22–31)
CREAT SERPL-MCNC: 1.03 MG/DL — SIGNIFICANT CHANGE UP (ref 0.5–1.3)
GLUCOSE SERPL-MCNC: 101 MG/DL — HIGH (ref 70–99)
POTASSIUM SERPL-MCNC: 3.7 MMOL/L — SIGNIFICANT CHANGE UP (ref 3.5–5.3)
POTASSIUM SERPL-SCNC: 3.7 MMOL/L — SIGNIFICANT CHANGE UP (ref 3.5–5.3)
PROT SERPL-MCNC: 7.7 G/DL — SIGNIFICANT CHANGE UP (ref 6–8.3)
SODIUM SERPL-SCNC: 138 MMOL/L — SIGNIFICANT CHANGE UP (ref 135–145)

## 2020-03-01 PROCEDURE — 99232 SBSQ HOSP IP/OBS MODERATE 35: CPT | Mod: GC

## 2020-03-01 PROCEDURE — G9005: CPT

## 2020-03-01 RX ORDER — BACLOFEN 100 %
5 POWDER (GRAM) MISCELLANEOUS THREE TIMES A DAY
Refills: 0 | Status: DISCONTINUED | OUTPATIENT
Start: 2020-03-02 | End: 2020-03-04

## 2020-03-01 RX ORDER — SUMATRIPTAN SUCCINATE 4 MG/.5ML
100 INJECTION, SOLUTION SUBCUTANEOUS ONCE
Refills: 0 | Status: COMPLETED | OUTPATIENT
Start: 2020-03-01 | End: 2020-03-01

## 2020-03-01 RX ORDER — DIPHENHYDRAMINE HCL 50 MG
25 CAPSULE ORAL ONCE
Refills: 0 | Status: COMPLETED | OUTPATIENT
Start: 2020-03-01 | End: 2020-03-01

## 2020-03-01 RX ORDER — MAGNESIUM SULFATE 500 MG/ML
1 VIAL (ML) INJECTION ONCE
Refills: 0 | Status: COMPLETED | OUTPATIENT
Start: 2020-03-01 | End: 2020-03-01

## 2020-03-01 RX ORDER — ENOXAPARIN SODIUM 100 MG/ML
40 INJECTION SUBCUTANEOUS ONCE
Refills: 0 | Status: COMPLETED | OUTPATIENT
Start: 2020-03-01 | End: 2020-03-01

## 2020-03-01 RX ORDER — BACLOFEN 100 %
5 POWDER (GRAM) MISCELLANEOUS ONCE
Refills: 0 | Status: COMPLETED | OUTPATIENT
Start: 2020-03-01 | End: 2020-03-01

## 2020-03-01 RX ADMIN — Medication 12.5 MILLIGRAM(S): at 05:25

## 2020-03-01 RX ADMIN — SUMATRIPTAN SUCCINATE 100 MILLIGRAM(S): 4 INJECTION, SOLUTION SUBCUTANEOUS at 13:59

## 2020-03-01 RX ADMIN — ENOXAPARIN SODIUM 40 MILLIGRAM(S): 100 INJECTION SUBCUTANEOUS at 10:08

## 2020-03-01 RX ADMIN — LOSARTAN POTASSIUM 50 MILLIGRAM(S): 100 TABLET, FILM COATED ORAL at 05:25

## 2020-03-01 RX ADMIN — Medication 650 MILLIGRAM(S): at 05:56

## 2020-03-01 RX ADMIN — GABAPENTIN 400 MILLIGRAM(S): 400 CAPSULE ORAL at 18:36

## 2020-03-01 RX ADMIN — Medication 25 MILLIGRAM(S): at 14:00

## 2020-03-01 RX ADMIN — Medication 100 GRAM(S): at 14:00

## 2020-03-01 RX ADMIN — GABAPENTIN 400 MILLIGRAM(S): 400 CAPSULE ORAL at 05:25

## 2020-03-01 RX ADMIN — Medication 650 MILLIGRAM(S): at 11:31

## 2020-03-01 RX ADMIN — ATORVASTATIN CALCIUM 80 MILLIGRAM(S): 80 TABLET, FILM COATED ORAL at 21:07

## 2020-03-01 RX ADMIN — SUMATRIPTAN SUCCINATE 100 MILLIGRAM(S): 4 INJECTION, SOLUTION SUBCUTANEOUS at 14:29

## 2020-03-01 RX ADMIN — Medication 80 MILLIGRAM(S): at 14:00

## 2020-03-01 RX ADMIN — Medication 650 MILLIGRAM(S): at 05:26

## 2020-03-01 RX ADMIN — Medication 1 MILLIGRAM(S): at 11:27

## 2020-03-01 RX ADMIN — Medication 650 MILLIGRAM(S): at 12:01

## 2020-03-01 RX ADMIN — Medication 5 MILLIGRAM(S): at 14:00

## 2020-03-01 RX ADMIN — Medication 30 MILLIGRAM(S): at 11:27

## 2020-03-01 NOTE — CHART NOTE - NSCHARTNOTEFT_GEN_A_CORE
EUROLOGY ATTENDING: JOEL     CC: WEAKNESS, ESPECIALLY ON THE LEFT    PATIENT SEEN & EXAMINED WITH NEUROLOGY RESIDENTS  SUNRISE RECORDS REVIEWED  LABS REVIEWED  IMAGING REVIEWED  MET WITH MULTIPLE FAMILY MEMEBERS AT BEDSIDE    Briefly, 66 yo RH woman Albanian speaking (who asks that her daughter interpret, which her daughter does for us today), who reports migraines in the remote past (as a teenager), but these got better as she got older. She now has about six headaches per month total. However, for the last one month, she has had increasing difficulty going up and down the stairs. She also feels that her speaking is off (dysarthria, not aphasia) and that she has odd numbness involving the back of her head and her right wrist and most of her right leg. She is very worried about these symptoms. She takes Aspirin, lipitor, BP mds, (losartan), Paxil, antivert, and neurontin 400mg daily. She reports that the Neurontin was started by her PCP for fibromylagia. She has a blood pressure cuff at home (her daughter purchased it for her) and her BP ranges from 97/60 to 200/97. She feels a bad headache when her pressure is high. She is asymptomatic when it is low.    She was initially admitted to CVA service as she has risk factors for CVA: HTN, hyperlipidemia, and borderline diabetes Mellitus (serum glucose=137 and Hga1c was 6). When imaging showed no CVA and her symptoms did not appear to be related to cerebral dysfunction on exam, she was transferred to general neurology.    There are no complaints of nausea/vomiting, visual changes or any signs of increased ICP.   No seizures, nor any seizure-like symptomatology.  There are no other cranial nerve complaints: no double-vision, no blurry vision, no facial asymmetry, no trouble swallowing, no hearing loss. HOWEVER, SHE DOES HAVE CRANIAL (AND LIMB) NUMBNESS    EXAMINATION  CONSTITUTIONAL	Healthy appearing, well-groomed, and without deformities.  MENTAL STATUS	Awake and oriented to person, place and date/time  	Normal attention span & concentration   	Nonaphasic speech – normal naming, repetition, and comprehension.  	Recent and remote memory intact.  	Normal fund of knowledge, including current events; Provides full history.  CRANIAL NERVES	II: Visual fields are full.   	III, IV, and VI: PERRLA. EOMs are normal.  	V: Facial sensation is normal bilaterally  	VII: Facial strength is normal bilaterally.  	VIII: Hearing is intact.  	IX, X: Palate is midline and gag intact.  	XI: SCM and trapezius have normal strength bilaterally.  	XII: Tongue is midline and there is no atrophy or fasciculations.  MOTOR	No drift. Muscle tone: no evidence of rigidity or resistance. SOME GIVE WAY WEAKNESS. ABLE TO STAND ON TOES AND HEELS.  Muscle strength: arms and legs, proximal and distal, flexors and extensors are normal. No atrophy or fasciculations.  SENSATION	Normal in arms, legs, and trunk. (+) romberg  COORDINATION	Rapid alternating movements are normal in the upper and lower extremities. Finger/nose & heel/knee/shin are normal bilaterally.  REFLEXES	All present and normal at biceps, triceps, and brachioradialis bilaterally. Knees and ankle jerks are normal bilaterally as well. Toes are downgoing.  CARDIOVASCULAR	No peripheral edema.  HEENT	Atraumatic.    IMAGING  I personally reviewed NEUROimaging dated 2/27/20 (MRI). There is no CVA, there is no old encephaloimalacia. There is no T2 FLAIR change c/w MS, it is a normal study.    IMPRESSION  WEAKNESS -- Would pursue CSF study to look for AIDP (Carpenter Estrada?) as etiology of her complaints. Her normal exam and intact reflexes are reassuring, however, occasionally carpenter estrada (and other AIDP syndromes) have prominent ataxic gait and intact DTRs.  WORKUP -- systemic antibodies for anti-Gq1b and ACHr to be sent. CSF under fluoroscopy as she is obese. CSF to be sent for cell count, protein/glucose, gram stain/culture, viral PCR.  PT EDUCATION -- Provided reassurance.  DISPO -- PT/OT evaluation requested. Will need to stay until after CSF evaluation.    Total time spent was 36 minutes, of which half in counselling and coordination of care.

## 2020-03-01 NOTE — PROGRESS NOTE ADULT - ASSESSMENT
Impression: Etiology of her symptoms is unclear. It is possible that she may have AIDP, though her presentation is unusual for AIDP, and potentially myasthenia gravis. Would be important to further evaluate.     Plan:  [x] plan for LP under IR 3/2 (due to habitus) for CSF cell count, protein, glucose, gram stain/culture, viral PCR.      [>>] Aspirin 81 held; Lovenox 3/2 held  [x] f/u AChR antibodies - binding, blocking, modulating; anti-Gq1b antibody  [x] MRI Brain WAW - no acute infarct, or abnormal enhancement  [x] PT/OT -CHADD  [x] tx from stroke service 2/29 Impression: Etiology of her symptoms is unclear. It is possible that she may have AIDP, though her presentation is unusual for AIDP, and potentially myasthenia gravis. Would be important to further evaluate.     3/1: Pt reports painful spastic neck muscles since 1 week and facial numbness going from cheeks to backwards. Exam shows Tenderness at occipital groove with reproducible sx of radiating pain towards frontal bones, spastic SCMs likely indicative of Torticollis. I also suspect gait difficulty maybe stemming from cervicogenic pain as pt is not overtly weak in her LEs. Pt also has hx of migrainous HA in her teens and middle age. Pending LP r/o inflammatory process.    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      [>>] Aspirin 81 held; Lovenox 3/2 held  [x] f/u AChR antibodies - binding, blocking, modulating; anti-Gq1b antibody  [x] MRI Brain WAW - no acute infarct, or abnormal enhancement  [x] PT/OT -CHADD  [x] tx from stroke service 2/29

## 2020-03-01 NOTE — PROGRESS NOTE ADULT - SUBJECTIVE AND OBJECTIVE BOX
NEUROLOGY PROGRESS NOTE    SUBJECTIVE: no acute events reported    MEDICATIONS  (STANDING):  aspirin enteric coated 81 milliGRAM(s) Oral daily  atorvastatin 80 milliGRAM(s) Oral at bedtime  enoxaparin Injectable 40 milliGRAM(s) SubCutaneous daily  folic acid 1 milliGRAM(s) Oral daily  gabapentin 400 milliGRAM(s) Oral two times a day  hydrochlorothiazide 12.5 milliGRAM(s) Oral daily  losartan 50 milliGRAM(s) Oral daily  PARoxetine 30 milliGRAM(s) Oral daily    MEDICATIONS  (PRN):  acetaminophen   Tablet .. 650 milliGRAM(s) Oral every 6 hours PRN Mild Pain (1 - 3), Moderate Pain (4 - 6), Severe Pain (7 - 10)    Vital Signs Last 24 Hrs  T(C): 36.3 (01 Mar 2020 05:09), Max: 36.7 (29 Feb 2020 13:23)  T(F): 97.4 (01 Mar 2020 05:09), Max: 98.1 (29 Feb 2020 13:23)  HR: 66 (01 Mar 2020 05:09) (66 - 76)  BP: 163/88 (01 Mar 2020 06:30) (132/78 - 176/98)  BP(mean): --  RR: 18 (01 Mar 2020 05:09) (17 - 18)  SpO2: 98% (01 Mar 2020 05:09) (95% - 100%)    Mental status: Awake, alert, oriented x3, speech fluent, follows commands, no neglect, normal memory   Cranial Nerves: mild, chronic left lower motor neuron facial palsy with synkinesis; no right facial palsy; no right ptosis, no nystagmus, no dysarthria,  tongue midline  Motor exam: Normal tone, no drift, 5/5 RUE, 5/5 RLE, 5/5 LUE, 5/5 LLE, normal fine finger movements.  Sensation: Intact to light touch   Coordination/ Gait: No dysmetria, gait wide based and unsteady- requires moderate assistance of 1 person    Labs:   cbc                      12.8   6.55  )-----------( 280      ( 28 Feb 2020 06:13 )             40.4     [id number]    135  |  100  |  15  ----------------------------<  105<H>  3.8   |  20<L>  |  1.03    Ca    9.3      28 Feb 2020 06:13    < from: MR Head w/wo IV Cont (02.27.20 @ 18:34) >    IMPRESSION:     No acute intracranial pathology or abnormal enhancement. Scattered small vessel white matter ischemic changes. No acute infarcts, hemorrhage or mass.    < end of copied text >

## 2020-03-01 NOTE — PROGRESS NOTE ADULT - SUBJECTIVE AND OBJECTIVE BOX
Subjective: Patient seen and examined. No new events except as noted.   sleepy   comfortable     REVIEW OF SYSTEMS:    CONSTITUTIONAL: +weakness, fevers or chills  EYES/ENT: No visual changes;  No vertigo or throat pain   NECK: No pain or stiffness  RESPIRATORY: No cough, wheezing, hemoptysis; No shortness of breath  CARDIOVASCULAR: No chest pain or palpitations  GASTROINTESTINAL: No abdominal or epigastric pain. No nausea, vomiting, or hematemesis; No diarrhea or constipation. No melena or hematochezia.  GENITOURINARY: No dysuria, frequency or hematuria  NEUROLOGICAL: +numbness or weakness  SKIN: No itching, burning, rashes, or lesions   All other review of systems is negative unless indicated above.    MEDICATIONS:  MEDICATIONS  (STANDING):  atorvastatin 80 milliGRAM(s) Oral at bedtime  folic acid 1 milliGRAM(s) Oral daily  gabapentin 400 milliGRAM(s) Oral two times a day  hydrochlorothiazide 12.5 milliGRAM(s) Oral daily  losartan 50 milliGRAM(s) Oral daily  PARoxetine 30 milliGRAM(s) Oral daily      PHYSICAL EXAM:  T(C): 36.6 (03-01-20 @ 09:34), Max: 36.7 (02-29-20 @ 13:23)  HR: 68 (03-01-20 @ 09:34) (66 - 76)  BP: 155/74 (03-01-20 @ 09:34) (132/78 - 176/98)  RR: 18 (03-01-20 @ 09:34) (17 - 18)  SpO2: 97% (03-01-20 @ 09:34) (95% - 99%)  Wt(kg): --  I&O's Summary    29 Feb 2020 07:01  -  01 Mar 2020 07:00  --------------------------------------------------------  IN: 1075 mL / OUT: 400 mL / NET: 675 mL          Appearance: NAD	  HEENT:   Dry oral mucosa, PERRL, EOMI	  Lymphatic: No lymphadenopathy , no edema  Cardiovascular: Normal S1 S2, No JVD, No murmurs , Peripheral pulses palpable 2+ bilaterally  Respiratory: Lungs clear to auscultation, normal effort 	  Gastrointestinal:  Soft, Non-tender, + BS	  Skin: No rashes, No ecchymoses, No cyanosis, warm to touch  Musculoskeletal: Normal range of motion, normal strength  Psychiatry:  Sleepy   Ext: No edema      LABS:    CARDIAC MARKERS:            03-01    138  |  98  |  19  ----------------------------<  101<H>  3.7   |  26  |  1.03    Ca    9.3      01 Mar 2020 06:13    TPro  7.7  /  Alb  4.2  /  TBili  0.4  /  DBili  x   /  AST  26  /  ALT  16  /  AlkPhos  117  03-01    proBNP:   Lipid Profile:   HgA1c:   TSH:             TELEMETRY: 	    ECG:  	  RADIOLOGY:   DIAGNOSTIC TESTING:  [ ] Echocardiogram:  [ ]  Catheterization:  [ ] Stress Test:    OTHER:

## 2020-03-02 DIAGNOSIS — Z71.89 OTHER SPECIFIED COUNSELING: ICD-10-CM

## 2020-03-02 LAB
APTT BLD: 30.3 SEC — SIGNIFICANT CHANGE UP (ref 27.5–36.3)
DSDNA AB FLD-ACNC: <0.2 AI — SIGNIFICANT CHANGE UP
ENA SS-A AB FLD IA-ACNC: <0.2 AI — SIGNIFICANT CHANGE UP
ERYTHROCYTE [SEDIMENTATION RATE] IN BLOOD: 25 MM/HR — HIGH (ref 0–20)
INR BLD: 0.92 RATIO — SIGNIFICANT CHANGE UP (ref 0.88–1.16)
PROTHROM AB SERPL-ACNC: 10.6 SEC — SIGNIFICANT CHANGE UP (ref 10–12.9)

## 2020-03-02 PROCEDURE — 99232 SBSQ HOSP IP/OBS MODERATE 35: CPT | Mod: GC

## 2020-03-02 RX ORDER — ENOXAPARIN SODIUM 100 MG/ML
40 INJECTION SUBCUTANEOUS DAILY
Refills: 0 | Status: DISCONTINUED | OUTPATIENT
Start: 2020-03-04 | End: 2020-03-05

## 2020-03-02 RX ADMIN — Medication 650 MILLIGRAM(S): at 15:46

## 2020-03-02 RX ADMIN — Medication 30 MILLIGRAM(S): at 13:08

## 2020-03-02 RX ADMIN — Medication 650 MILLIGRAM(S): at 06:20

## 2020-03-02 RX ADMIN — Medication 650 MILLIGRAM(S): at 16:30

## 2020-03-02 RX ADMIN — Medication 12.5 MILLIGRAM(S): at 05:38

## 2020-03-02 RX ADMIN — GABAPENTIN 400 MILLIGRAM(S): 400 CAPSULE ORAL at 05:38

## 2020-03-02 RX ADMIN — LOSARTAN POTASSIUM 50 MILLIGRAM(S): 100 TABLET, FILM COATED ORAL at 05:38

## 2020-03-02 RX ADMIN — Medication 5 MILLIGRAM(S): at 13:08

## 2020-03-02 RX ADMIN — Medication 5 MILLIGRAM(S): at 05:38

## 2020-03-02 RX ADMIN — Medication 5 MILLIGRAM(S): at 21:18

## 2020-03-02 RX ADMIN — Medication 1 MILLIGRAM(S): at 13:08

## 2020-03-02 RX ADMIN — Medication 500 MILLIGRAM(S): at 18:26

## 2020-03-02 RX ADMIN — ATORVASTATIN CALCIUM 80 MILLIGRAM(S): 80 TABLET, FILM COATED ORAL at 21:18

## 2020-03-02 RX ADMIN — Medication 650 MILLIGRAM(S): at 05:39

## 2020-03-02 RX ADMIN — GABAPENTIN 400 MILLIGRAM(S): 400 CAPSULE ORAL at 18:24

## 2020-03-02 NOTE — PROGRESS NOTE ADULT - ASSESSMENT
Etiology of her symptoms is unclear. It is possible that she may have AIDP (Jayden Zamorano), though her presentation is unusual for AIDP, and potentially myasthenia gravis given diplopia and lid ptosis. She is currently pending an LP and MG Abs for further investigation. She also has painful spastic neck muscles for the past week and facial numbness radiating from the top of her head down to the base of her neck. Previously had occipital groove tenderness with reproducible pain but currently is not tender and does not report pain. Spastic SCMs indicative of possible torticollis.      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       -> currently holding Aspirin 81; Lovenox for LP  [x] f/u AChR Ab - binding, blocking, modulating; anti-Gq1b Ab  [x] MRI Brain WAW - no acute infarct, or abnormal enhancement  [x] PT/OT -CHADD Etiology of her symptoms is unclear. It is possible that she may have AIDP (Jayden Zamorano), though her presentation is unusual for AIDP, and potentially myasthenia gravis given diplopia and lid ptosis. She is currently pending an LP and MG Abs for further investigation. She also has painful spastic neck muscles for the past week and facial numbness radiating from the top of her head down to the base of her neck. Previously had occipital groove tenderness with reproducible pain but currently is not tender and does not report pain. Spastic SCMs indicative of possible torticollis.      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       -> currently holding Aspirin 81; Lovenox, Plavix for LP  [x] f/u AChR Ab - binding, blocking, modulating; anti-Gq1b Ab  [x] MRI Brain WAW - no acute infarct, or abnormal enhancement  [x] PT/OT -CHADD Etiology of her symptoms is unclear. It is possible that she may have AIDP (Jayden Zamorano), though her presentation is unusual for AIDP, and potentially myasthenia gravis given diplopia and lid ptosis. She is currently pending an LP and MG Abs for further investigation. She also has painful spastic neck muscles for the past week and facial numbness radiating from the top of her head down to the base of her neck. Previously had occipital groove tenderness with reproducible pain but currently is not tender and does not report pain. Spastic SCMs indicative of possible torticollis.  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       -> currently holding Aspirin 81; Lovenox, Plavix for LP  [x] f/u AChR Ab - binding, blocking, modulating; anti-Gq1b Ab  [x] MRI Brain WAW - no acute infarct, or abnormal enhancement  [x] PT/OT -CHADD

## 2020-03-02 NOTE — PROGRESS NOTE ADULT - SUBJECTIVE AND OBJECTIVE BOX
SUBJECTIVE: No overnight events. States she does not have a headache this morning but feels like a numbness from the top of her head to the base of her neck. Is unable to turn her head from side to side.     Vital Signs Last 24 Hrs  T(C): 36.8 (02 Mar 2020 05:32), Max: 37.3 (02 Mar 2020 01:10)  T(F): 98.3 (02 Mar 2020 05:32), Max: 99.1 (02 Mar 2020 01:10)  HR: 74 (02 Mar 2020 05:32) (70 - 80)  BP: 156/81 (02 Mar 2020 05:32) (148/83 - 160/76)  BP(mean): --  RR: 16 (02 Mar 2020 05:32) (16 - 18)  SpO2: 98% (02 Mar 2020 05:32) (96% - 98%)    Mental status: Awake, alert, oriented x3, speech fluent, follows commands, no neglect, normal memory   Cranial Nerves: mild, chronic left lower motor neuron facial palsy with synkinesis; no right facial palsy; no right ptosis, no nystagmus, no dysarthria,  tongue midline  Motor exam: Normal tone, no drift, 5/5 RUE, 5/5 RLE, 5/5 LUE, 5/5 LLE, normal fine finger movements.  Sensation: Intact to light touch   Coordination/ Gait: No dysmetria, gait wide based and unsteady- requires moderate assistance of 1 person      LABORATORY:  CBC   Chem 03-01    138  |  98  |  19  ----------------------------<  101<H>  3.7   |  26  |  1.03    Ca    9.3      01 Mar 2020 06:13    TPro  7.7  /  Alb  4.2  /  TBili  0.4  /  DBili  x   /  AST  26  /  ALT  16  /  AlkPhos  117  03-01    LFTs LIVER FUNCTIONS - ( 01 Mar 2020 06:13 )  Alb: 4.2 g/dL / Pro: 7.7 g/dL / ALK PHOS: 117 U/L / ALT: 16 U/L / AST: 26 U/L / GGT: x           Coagulopathy PT/INR - ( 02 Mar 2020 09:38 )   PT: 10.6 sec;   INR: 0.92 ratio         PTT - ( 02 Mar 2020 09:38 )  PTT:30.3 sec  Lipid Panel 02-28 Chol 289<H> <H> HDL 65 Trig 188<H>  A1c 02-28 [id number] 6.0    Cardiac enzymes     U/A   MEDICATIONS  (STANDING):  atorvastatin 80 milliGRAM(s) Oral at bedtime  baclofen 5 milliGRAM(s) Oral three times a day  folic acid 1 milliGRAM(s) Oral daily  gabapentin 400 milliGRAM(s) Oral two times a day  hydrochlorothiazide 12.5 milliGRAM(s) Oral daily  losartan 50 milliGRAM(s) Oral daily  PARoxetine 30 milliGRAM(s) Oral daily    MEDICATIONS  (PRN):  acetaminophen   Tablet .. 650 milliGRAM(s) Oral every 6 hours PRN Mild Pain (1 - 3), Moderate Pain (4 - 6), Severe Pain (7 - 10)

## 2020-03-02 NOTE — PROGRESS NOTE ADULT - SUBJECTIVE AND OBJECTIVE BOX
Subjective: Patient seen and examined. No new events except as noted.   resting comfortably   sleepy     REVIEW OF SYSTEMS:    CONSTITUTIONAL: +weakness, fevers or chills  EYES/ENT: No visual changes;  No vertigo or throat pain   NECK: No pain or stiffness  RESPIRATORY: No cough, wheezing, hemoptysis; No shortness of breath  CARDIOVASCULAR: No chest pain or palpitations  GASTROINTESTINAL: No abdominal or epigastric pain. No nausea, vomiting, or hematemesis; No diarrhea or constipation. No melena or hematochezia.  GENITOURINARY: No dysuria, frequency or hematuria  NEUROLOGICAL: No numbness or weakness  SKIN: No itching, burning, rashes, or lesions   All other review of systems is negative unless indicated above.    MEDICATIONS:  MEDICATIONS  (STANDING):  atorvastatin 80 milliGRAM(s) Oral at bedtime  baclofen 5 milliGRAM(s) Oral three times a day  folic acid 1 milliGRAM(s) Oral daily  gabapentin 400 milliGRAM(s) Oral two times a day  hydrochlorothiazide 12.5 milliGRAM(s) Oral daily  losartan 50 milliGRAM(s) Oral daily  PARoxetine 30 milliGRAM(s) Oral daily      PHYSICAL EXAM:  T(C): 36.8 (03-02-20 @ 05:32), Max: 37.3 (03-02-20 @ 01:10)  HR: 74 (03-02-20 @ 05:32) (70 - 80)  BP: 156/81 (03-02-20 @ 05:32) (148/83 - 160/76)  RR: 16 (03-02-20 @ 05:32) (16 - 18)  SpO2: 98% (03-02-20 @ 05:32) (96% - 98%)  Wt(kg): --  I&O's Summary    01 Mar 2020 07:01  -  02 Mar 2020 07:00  --------------------------------------------------------  IN: 1130 mL / OUT: 1550 mL / NET: -420 mL          Appearance: NAD  HEENT:  Dry oral mucosa, PERRL, EOMI	  Lymphatic: No lymphadenopathy , no edema  Cardiovascular: Normal S1 S2, No JVD, No murmurs , Peripheral pulses palpable 2+ bilaterally  Respiratory: Lungs clear to auscultation, normal effort 	  Gastrointestinal:  Soft, Non-tender, + BS	  Skin: No rashes, No ecchymoses, No cyanosis, warm to touch  Musculoskeletal: Normal range of motion, normal strength  Psychiatry:  Sleepy   Ext: No edema    LABS:    CARDIAC MARKERS:            03-01    138  |  98  |  19  ----------------------------<  101<H>  3.7   |  26  |  1.03    Ca    9.3      01 Mar 2020 06:13    TPro  7.7  /  Alb  4.2  /  TBili  0.4  /  DBili  x   /  AST  26  /  ALT  16  /  AlkPhos  117  03-01    proBNP:   Lipid Profile:   HgA1c:   TSH:             TELEMETRY: 	    ECG:  	  RADIOLOGY:   DIAGNOSTIC TESTING:  [ ] Echocardiogram:  [ ]  Catheterization:  [ ] Stress Test:    OTHER:

## 2020-03-03 ENCOUNTER — RESULT REVIEW (OUTPATIENT)
Age: 65
End: 2020-03-03

## 2020-03-03 DIAGNOSIS — E03.9 HYPOTHYROIDISM, UNSPECIFIED: ICD-10-CM

## 2020-03-03 DIAGNOSIS — E78.5 HYPERLIPIDEMIA, UNSPECIFIED: ICD-10-CM

## 2020-03-03 DIAGNOSIS — R73.03 PREDIABETES: ICD-10-CM

## 2020-03-03 LAB
ANA PAT FLD IF-IMP: ABNORMAL
ANA TITR SER: ABNORMAL
ANION GAP SERPL CALC-SCNC: 14 MMOL/L — SIGNIFICANT CHANGE UP (ref 5–17)
APPEARANCE CSF: CLEAR — SIGNIFICANT CHANGE UP
APPEARANCE SPUN FLD: COLORLESS — SIGNIFICANT CHANGE UP
BUN SERPL-MCNC: 25 MG/DL — HIGH (ref 7–23)
CALCIUM SERPL-MCNC: 9.3 MG/DL — SIGNIFICANT CHANGE UP (ref 8.4–10.5)
CHLORIDE SERPL-SCNC: 97 MMOL/L — SIGNIFICANT CHANGE UP (ref 96–108)
CO2 SERPL-SCNC: 23 MMOL/L — SIGNIFICANT CHANGE UP (ref 22–31)
COLOR CSF: SIGNIFICANT CHANGE UP
CREAT SERPL-MCNC: 0.91 MG/DL — SIGNIFICANT CHANGE UP (ref 0.5–1.3)
CRP SERPL-MCNC: 0.24 MG/DL — SIGNIFICANT CHANGE UP (ref 0–0.4)
CSF PCR RESULT: SIGNIFICANT CHANGE UP
GLUCOSE CSF-MCNC: 65 MG/DL — SIGNIFICANT CHANGE UP (ref 40–70)
GLUCOSE SERPL-MCNC: 97 MG/DL — SIGNIFICANT CHANGE UP (ref 70–99)
GRAM STN FLD: SIGNIFICANT CHANGE UP
LYMPHOCYTES # CSF: 85 % — HIGH (ref 40–80)
MONOS+MACROS NFR CSF: 15 % — SIGNIFICANT CHANGE UP (ref 15–45)
NEUTROPHILS # CSF: 0 % — SIGNIFICANT CHANGE UP (ref 0–6)
NRBC NFR CSF: 1 /UL — SIGNIFICANT CHANGE UP (ref 0–5)
POTASSIUM SERPL-MCNC: 3.9 MMOL/L — SIGNIFICANT CHANGE UP (ref 3.5–5.3)
POTASSIUM SERPL-SCNC: 3.9 MMOL/L — SIGNIFICANT CHANGE UP (ref 3.5–5.3)
PROT CSF-MCNC: 52 MG/DL — HIGH (ref 15–45)
RBC # CSF: 0 /UL — SIGNIFICANT CHANGE UP (ref 0–0)
RHEUMATOID FACT SERPL-ACNC: <10 IU/ML — SIGNIFICANT CHANGE UP (ref 0–13)
SODIUM SERPL-SCNC: 134 MMOL/L — LOW (ref 135–145)
SPECIMEN SOURCE: SIGNIFICANT CHANGE UP
T3 SERPL-MCNC: 31 NG/DL — LOW (ref 80–200)
T4 FREE SERPL-MCNC: 0.2 NG/DL — LOW (ref 0.9–1.8)
TSH SERPL-MCNC: 114 UIU/ML — HIGH (ref 0.27–4.2)
TUBE TYPE: SIGNIFICANT CHANGE UP

## 2020-03-03 PROCEDURE — 72156 MRI NECK SPINE W/O & W/DYE: CPT | Mod: 26

## 2020-03-03 PROCEDURE — 62328 DX LMBR SPI PNXR W/FLUOR/CT: CPT | Mod: 26

## 2020-03-03 PROCEDURE — 99221 1ST HOSP IP/OBS SF/LOW 40: CPT

## 2020-03-03 PROCEDURE — 88108 CYTOPATH CONCENTRATE TECH: CPT | Mod: 26

## 2020-03-03 PROCEDURE — 99232 SBSQ HOSP IP/OBS MODERATE 35: CPT | Mod: GC

## 2020-03-03 RX ORDER — AMLODIPINE BESYLATE 2.5 MG/1
2.5 TABLET ORAL DAILY
Refills: 0 | Status: DISCONTINUED | OUTPATIENT
Start: 2020-03-03 | End: 2020-03-03

## 2020-03-03 RX ORDER — LEVOTHYROXINE SODIUM 125 MCG
50 TABLET ORAL
Refills: 0 | Status: DISCONTINUED | OUTPATIENT
Start: 2020-03-03 | End: 2020-03-05

## 2020-03-03 RX ORDER — LEVOTHYROXINE SODIUM 125 MCG
50 TABLET ORAL ONCE
Refills: 0 | Status: COMPLETED | OUTPATIENT
Start: 2020-03-03 | End: 2020-03-03

## 2020-03-03 RX ORDER — LOSARTAN POTASSIUM 100 MG/1
100 TABLET, FILM COATED ORAL DAILY
Refills: 0 | Status: DISCONTINUED | OUTPATIENT
Start: 2020-03-03 | End: 2020-03-05

## 2020-03-03 RX ADMIN — GABAPENTIN 400 MILLIGRAM(S): 400 CAPSULE ORAL at 05:50

## 2020-03-03 RX ADMIN — Medication 5 MILLIGRAM(S): at 22:29

## 2020-03-03 RX ADMIN — Medication 650 MILLIGRAM(S): at 06:17

## 2020-03-03 RX ADMIN — Medication 12.5 MILLIGRAM(S): at 05:45

## 2020-03-03 RX ADMIN — Medication 5 MILLIGRAM(S): at 13:40

## 2020-03-03 RX ADMIN — Medication 650 MILLIGRAM(S): at 14:35

## 2020-03-03 RX ADMIN — Medication 500 MILLIGRAM(S): at 06:17

## 2020-03-03 RX ADMIN — GABAPENTIN 400 MILLIGRAM(S): 400 CAPSULE ORAL at 17:38

## 2020-03-03 RX ADMIN — LOSARTAN POTASSIUM 50 MILLIGRAM(S): 100 TABLET, FILM COATED ORAL at 05:44

## 2020-03-03 RX ADMIN — Medication 1 MILLIGRAM(S): at 13:41

## 2020-03-03 RX ADMIN — Medication 50 MICROGRAM(S): at 13:40

## 2020-03-03 RX ADMIN — Medication 5 MILLIGRAM(S): at 05:44

## 2020-03-03 RX ADMIN — Medication 30 MILLIGRAM(S): at 13:41

## 2020-03-03 RX ADMIN — Medication 500 MILLIGRAM(S): at 05:45

## 2020-03-03 RX ADMIN — Medication 500 MILLIGRAM(S): at 17:38

## 2020-03-03 RX ADMIN — ATORVASTATIN CALCIUM 80 MILLIGRAM(S): 80 TABLET, FILM COATED ORAL at 22:29

## 2020-03-03 RX ADMIN — Medication 650 MILLIGRAM(S): at 05:45

## 2020-03-03 RX ADMIN — Medication 650 MILLIGRAM(S): at 13:45

## 2020-03-03 NOTE — DIETITIAN INITIAL EVALUATION ADULT. - PERTINENT LABORATORY DATA
Na 134, K+ 3.9, BUN 25, Cr 0.91, BG 97, Phos --, Alk Phos --, AST --, ALT --, Mg --, Ca 9.3, HbA1c --

## 2020-03-03 NOTE — CONSULT NOTE ADULT - ASSESSMENT
65y R-handed F with PMH of HTN, HLD, DM, L. sided bell's palsy, vertigo, hypothyroidism presenting with s/o HA, subjective facial numbness and facial heaviness, vertical and horizontal diplopia unclear if binocular or monocular, and gait dysequilibrium.    On exam, she has a chronic L. facial droop at nasolabial fold, synkinesis of lip, decreased hearing on left to tuning fork, Moderate R. facial droop including frontalis, and significant gait instabilty requiring 2 person assist to stand.
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

## 2020-03-03 NOTE — DIETITIAN INITIAL EVALUATION ADULT. - ENERGY NEEDS
HT 64 inches, ,  pounds, BMI 35.2,  pounds  Dxd R/O AIDP, Myasthenia Gravis, Meningitis, [place] Palsy.  S/P LP.  Skin intact, well nourished.

## 2020-03-03 NOTE — DIETITIAN INITIAL EVALUATION ADULT. - PERTINENT MEDS FT
MEDICATIONS  (STANDING):  atorvastatin 80 milliGRAM(s) Oral at bedtime  baclofen 5 milliGRAM(s) Oral three times a day  folic acid 1 milliGRAM(s) Oral daily  gabapentin 400 milliGRAM(s) Oral two times a day  hydrochlorothiazide 12.5 milliGRAM(s) Oral daily  levothyroxine 50 MICROGram(s) Oral <User Schedule>  losartan 100 milliGRAM(s) Oral daily  naproxen 500 milliGRAM(s) Oral two times a day  PARoxetine 30 milliGRAM(s) Oral daily    MEDICATIONS  (PRN):  acetaminophen   Tablet .. 650 milliGRAM(s) Oral every 6 hours PRN Mild Pain (1 - 3), Moderate Pain (4 - 6), Severe Pain (7 - 10)

## 2020-03-03 NOTE — PROGRESS NOTE ADULT - ASSESSMENT
Etiology of her symptoms is unclear. It is possible that she may have AIDP (Jayden Zamorano), though her presentation is unusual for AIDP, and potentially myasthenia gravis given diplopia and lid ptosis. She is currently pending an LP and MG Abs for further investigation. She also has painful spastic neck muscles for the past week and facial numbness radiating from the top of her head down to the base of her neck. Previously had occipital groove tenderness with reproducible pain but currently is not tender and does not report pain. Spastic SCMs indicative of possible torticollis.  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       -> currently holding Aspirin 81; Lovenox, Plavix for LP  [x] f/u AChR Ab - binding, blocking, modulating; anti-Gq1b Ab  [x] MRI Brain WAW - no acute infarct, or abnormal enhancement  [x] PT/OT -CHADD Etiology of her symptoms is unclear. It is possible that she may have AIDP (Jayden Zamorano), though her presentation is unusual for AIDP, and potentially myasthenia gravis given diplopia and lid ptosis. She is currently pending an LP and MG Abs for further investigation. She also has painful spastic neck muscles for the past week and facial numbness radiating from the top of her head down to the base of her neck. Previously had occipital groove tenderness with reproducible pain but currently is not tender and does not report pain. Spastic SCMs indicative of possible torticollis.  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       -> currently holding Aspirin 81; Lovenox, Plavix for LP  [x] f/u AChR Ab - binding, blocking, modulating; anti-Gq1b Ab  [x] MRI Brain WAW - no acute infarct, or abnormal enhancement  [x] PT/OT -CHADD Etiology of her symptoms is unclear. It is possible that she may have AIDP (Jayden Zamorano), though her presentation is unusual for AIDP, and potentially myasthenia gravis given diplopia and lid ptosis. She is currently pending an LP and MG Abs for further investigation. She also has painful spastic neck muscles for the past week and facial numbness radiating from the top of her head down to the base of her neck. Previously had occipital groove tenderness with reproducible pain but currently is not tender and does not report pain. Spastic SCMs indicative of possible torticollis.  DDx: severe hypothyroidism vs atypical/viral meningitis (low suspicion)      Plan:  [x] start levothyroxine 50mcg daily per Endo (, free T4 0.2, T3 31)  [] MRI C-spine 4:30PM today  [x] increase losartan to 100mg qd  [x] ESR, CRP, Rheum factor negative, MARIA L pending  [x] Torticollis with occipital neuralgia - Heat packs TIDS, Baclofen 5mg TID, Naproxen 500mg TID  [x] s/p LP by IR w/ 1 cell, protein 52, glucose 65, gram stain/culture, viral PCR pending.   [x] restart ASA/Plavix and Lovenox  [x] f/u AChR Ab - binding, blocking, modulating; anti-Gq1b Ab  [x] MRI Brain WAW - no acute infarct, or abnormal enhancement  [x] PT/OT -CHADD

## 2020-03-03 NOTE — CONSULT NOTE ADULT - PROBLEM SELECTOR RECOMMENDATION 3
Orders per Stroke team   TTE
-Atorvastatin 80mg po qhs  -Will sign off at this time, re-consult as needed.  Peace Castaneda MD  Pager: [phone]  Nights and Weekends: [phone]

## 2020-03-03 NOTE — DIETITIAN INITIAL EVALUATION ADULT. - OTHER INFO
Patient seen for routine length of stay assessment.  Patient found sitting on edge of bed with LILLIAM.  Patient lives with daughter and LILLIAM. LILLIAM provided much of the information and assisted Patient with translation.  Appetite and intake has been fair in recent weeks as Patient has been ill.  Started out having hypothyroidism and underweight radioactive implants as per LILLIAM.  Has also experienced weakness, facial droop and sometimes feels her face, jaw is shifting and will bite side of cheek.  Recent weight gain approximately 10-12 pounds with illness and LILLIAM feels it is related to hypothyroidism.  Awaiting results of LP today.  Patient has a hx of diabetes but noted to be at prediabetic status.  Discussed importance of balanced and consistent meal planning, inclusion of protein and vegetables and limiting intake of breads and crackers, cookies.  Patient able to discuss protein sources.  Not a big vegetable eater. Supplements with multivitamin and calcium.

## 2020-03-03 NOTE — CHART NOTE - NSCHARTNOTEFT_GEN_A_CORE
66y/o female with PMH of HTN, HLD, DM, L. sided bell's palsy, vertigo who presented with h/a facial numbness/ drooping, weakness concern for AIDP. Presents for fluoroscopically guided lumbar puncture.     Allergies: hydralazine-like drugs (Other)    Labs:      03-03    134<L>  |  97  |  25<H>  ----------------------------<  97  3.9   |  23  |  0.91    Ca    9.3      03 Mar 2020 06:53        PT/INR - ( 02 Mar 2020 09:38 )   PT: 10.6 sec;   INR: 0.92 ratio         PTT - ( 02 Mar 2020 09:38 )  PTT:30.3 sec      Plan is for fluoroscopically guided lumbar puncture. Risks and benefits were discussed with the patient and/or patient health care proxy via  ID #           .  Risks discussed included bleeding, infection, nerve damage, and headache. 64y/o female with PMH of HTN, HLD, DM, L. sided bell's palsy, vertigo who presented with h/a facial numbness/ drooping, weakness concern for AIDP. Presents for fluoroscopically guided lumbar puncture.     Allergies: hydralazine-like drugs (Other)    Labs:      03-03    134<L>  |  97  |  25<H>  ----------------------------<  97  3.9   |  23  |  0.91    Ca    9.3      03 Mar 2020 06:53        PT/INR - ( 02 Mar 2020 09:38 )   PT: 10.6 sec;   INR: 0.92 ratio         PTT - ( 02 Mar 2020 09:38 )  PTT:30.3 sec      Plan is for fluoroscopically guided lumbar puncture. Risks and benefits were discussed with the patient and/or patient health care proxy via  ID # [id number].  Risks discussed included bleeding, infection, nerve damage, and headache.

## 2020-03-03 NOTE — PROGRESS NOTE ADULT - SUBJECTIVE AND OBJECTIVE BOX
Subjective: Patient seen and examined. No new events except as noted.   resting comfortably     REVIEW OF SYSTEMS:    CONSTITUTIONAL:+ weakness, fevers or chills  EYES/ENT: No visual changes;  No vertigo or throat pain   NECK: No pain or stiffness  RESPIRATORY: No cough, wheezing, hemoptysis; No shortness of breath  CARDIOVASCULAR: No chest pain or palpitations  GASTROINTESTINAL: No abdominal or epigastric pain. No nausea, vomiting, or hematemesis; No diarrhea or constipation. No melena or hematochezia.  GENITOURINARY: No dysuria, frequency or hematuria  NEUROLOGICAL: No numbness or weakness  SKIN: No itching, burning, rashes, or lesions   All other review of systems is negative unless indicated above.    MEDICATIONS:  MEDICATIONS  (STANDING):  atorvastatin 80 milliGRAM(s) Oral at bedtime  baclofen 5 milliGRAM(s) Oral three times a day  folic acid 1 milliGRAM(s) Oral daily  gabapentin 400 milliGRAM(s) Oral two times a day  hydrochlorothiazide 12.5 milliGRAM(s) Oral daily  levothyroxine 50 MICROGram(s) Oral <User Schedule>  levothyroxine 50 MICROGram(s) Oral once  losartan 100 milliGRAM(s) Oral daily  naproxen 500 milliGRAM(s) Oral two times a day  PARoxetine 30 milliGRAM(s) Oral daily      PHYSICAL EXAM:  T(C): 36.8 (03-03-20 @ 11:21), Max: 37 (03-02-20 @ 21:22)  HR: 67 (03-03-20 @ 11:21) (66 - 73)  BP: 157/90 (03-03-20 @ 11:21) (131/81 - 169/94)  RR: 18 (03-03-20 @ 11:21) (18 - 18)  SpO2: 97% (03-03-20 @ 11:21) (96% - 98%)  Wt(kg): --  I&O's Summary    02 Mar 2020 07:01  -  03 Mar 2020 07:00  --------------------------------------------------------  IN: 1040 mL / OUT: 0 mL / NET: 1040 mL    03 Mar 2020 07:01  -  03 Mar 2020 12:09  --------------------------------------------------------  IN: 0 mL / OUT: 100 mL / NET: -100 mL          Appearance: Normal	  HEENT:   Normal oral mucosa, PERRL, EOMI	  Lymphatic: No lymphadenopathy , no edema  Cardiovascular: Normal S1 S2, No JVD, No murmurs , Peripheral pulses palpable 2+ bilaterally  Respiratory: Lungs clear to auscultation, normal effort 	  Gastrointestinal:  Soft, Non-tender, + BS	  Skin: No rashes, No ecchymoses, No cyanosis, warm to touch  Musculoskeletal: Normal range of motion, normal strength  Psychiatry:  Mood & affect appropriate  Ext: No edema      LABS:    CARDIAC MARKERS:            03-03    134<L>  |  97  |  25<H>  ----------------------------<  97  3.9   |  23  |  0.91    Ca    9.3      03 Mar 2020 06:53      proBNP:   Lipid Profile:   HgA1c:   TSH: Thyroid Stimulating Hormone, Serum: 114.00 uIU/mL (03-03 @ 08:54)              TELEMETRY: 	    ECG:  	  RADIOLOGY:   DIAGNOSTIC TESTING:  [ ] Echocardiogram:  [ ]  Catheterization:  [ ] Stress Test:    OTHER:

## 2020-03-03 NOTE — CONSULT NOTE ADULT - PROBLEM SELECTOR RECOMMENDATION 9
On synthroid
-start patient on 50mcg po qdaily - explained to her and her daughter that she needs to take it on an empty stomach an hour before other meds and food. Also explained this to the RN and team   -spoke with patient's daughter Therese, who noted that she did know that her mom should have labs rechecked in 4-6 weeks, she was only aware of her f/u appt on April 18th. Recommended to her daughter that she try to be seen the first week of April instead.

## 2020-03-03 NOTE — CHART NOTE - NSCHARTNOTEFT_GEN_A_CORE
Status post lumbar puncture at the L2/3  level utilizing a 20G x6" spinal needle.      16cc of clear cerebral spinal fluid was obtained.    No immediate complications.

## 2020-03-03 NOTE — CONSULT NOTE ADULT - SUBJECTIVE AND OBJECTIVE BOX
HPI:  65y R-handed F with PMH of HTN, HLD, DM, L. sided bell's palsy, vertigo, hypothyroidism S/P radioactive Iodine presenting with s/o HA, subjective facial numbness and facial heaviness, vertical and horizontal diplopia unclear if binocular or monocular, and gait dysequilibrium.  LKN: Unclear precise time, but at least 5 days ago.     Describes feeling numbness in her face as well as HA first.  Describes HA as bifrontal radiating to the back and throbbing in nature waxing from 2-8/10 w/o photophobia or phonophobia.  Endorses associated nausea.  She then developed facial droop on the right and feeling unsteady with her gait requiring touch off from furniture frequently.  She describes dizziness as light-headedness and pre-syncopal sensation.  All of her symptoms are exacerbated by position changes.   She additionally reports having vertigo in the past, but notes this is distinctly different as she does not feel the room spinning sensation she had at initial presentations.     Neurology called for potential of venous sinus thrombosis.  CTV confirmed. (27 Feb 2020 07:54)      PAST MEDICAL & SURGICAL HISTORY:  Miscarriage: 1 spontaneous 1st trimester miscarriage  Hypothyroid: S/P Radioactive iodine  Vertigo  H/O Bell's palsy: Left sided  HTN (hypertension)  HLD (hyperlipidemia)  History of hysteroscopy  S/P appendectomy      FAMILY HISTORY:  Family history of miscarriage: Mother had two miscarriages, not sure what trimester      Social History:  Tobacco  ETOH  Illicits  Occupation    Outpatient Medications:    MEDICATIONS  (STANDING):  atorvastatin 80 milliGRAM(s) Oral at bedtime  baclofen 5 milliGRAM(s) Oral three times a day  folic acid 1 milliGRAM(s) Oral daily  gabapentin 400 milliGRAM(s) Oral two times a day  hydrochlorothiazide 12.5 milliGRAM(s) Oral daily  levothyroxine 50 MICROGram(s) Oral <User Schedule>  losartan 100 milliGRAM(s) Oral daily  naproxen 500 milliGRAM(s) Oral two times a day  PARoxetine 30 milliGRAM(s) Oral daily    MEDICATIONS  (PRN):  acetaminophen   Tablet .. 650 milliGRAM(s) Oral every 6 hours PRN Mild Pain (1 - 3), Moderate Pain (4 - 6), Severe Pain (7 - 10)      Allergies    hydralazine-like drugs (Other)    Intolerances      Review of Systems:  Constitutional: No fever, good appetite/po intake  Eyes: No blurry vision, diplopia  Neuro: No tremors  HEENT: No pain  Cardiovascular: No chest pain, palpitations  Respiratory: No SOB, no cough  GI: No nausea, vomiting,   : No dysuria, hematuria  Skin: no rash  Psych: no depression  Endocrine: no polyuria, polydipsia  Hem/lymph: no swelling  Osteoporosis: no fractures    ALL OTHER SYSTEMS REVIEWED AND NEGATIVE    UNABLE TO OBTAIN    PHYSICAL EXAM:  VITALS: T(C): 36.6 (03-03-20 @ 13:47)  T(F): 97.9 (03-03-20 @ 13:47), Max: 98.6 (03-02-20 @ 21:22)  HR: 67 (03-03-20 @ 13:47) (66 - 71)  BP: 146/87 (03-03-20 @ 13:47) (131/81 - 169/94)  RR:  (18 - 18)  SpO2:  (96% - 98%)  Wt(kg): --  GENERAL: NAD, well-groomed, well-developed  EYES: No proptosis, no lid lag, anicteric  HEENT:  Atraumatic, Normocephalic, moist mucous membranes  THYROID: Normal size, no palpable nodules  RESPIRATORY: Clear to auscultation bilaterally; No rales, rhonchi, wheezing, or rubs  CARDIOVASCULAR: Regular rate and rhythm; No murmurs; no peripheral edema  GI: Soft, nontender, non distended, normal bowel sounds  SKIN: Dry, intact, No rashes or lesions  NEURO: sensation intact, extraocular movements intact, no tremor, normal reflexes  PSYCH: reactive affect, euthymic mood  CUSHING'S SIGNS: no striae            03-03    134<L>  |  97  |  25<H>  ----------------------------<  97  3.9   |  23  |  0.91    EGFR if : 77  EGFR if non : 66    Ca    9.3      03-03    TPro  7.7  /  Alb  4.2  /  TBili  0.4  /  DBili  x   /  AST  26  /  ALT  16  /  AlkPhos  117  03-01      Thyroid Function Tests:  03-03 @ 08:54 .00 FreeT4 0.2 T3 31 Anti TPO -- Anti Thyroglobulin Ab -- TSI --      Hemoglobin A1C, Whole Blood: 6.0 % <H> [4.0 - 5.6] (02-28-20 @ 08:53)      02-28 Chol 289<H> <H> HDL 65 Trig 188<H>    Radiology:     A/P: yo male/female with hx of ................................... here with .......... 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 She also endorses a sharp 7/10 headache in the crown of her head. She has never had anything like this before and is being evaluated for meningeal disease s/p LP earlier today.   The patient denies taking any medications for her thyroid in the past. She just knows that neither side of her gland was working and she took 2 radioactive iodine tablets on 11/15/19 at [facility]. She is not a great historian. Of note she endorses cold intolerance, 13 pound weight gain in 3 months and weakness.   Used [employer]  Terese #[id number]  I spoke with her outpatient endocrinologist Dr. 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 She f/u in 1/21/2020 TSH 0.360 FT4 0.86 (0.6-1.77) and US showed b/l nodules. She was told to have her labs rechecked in 4-6 weeks but that did not happen.       PAST MEDICAL & SURGICAL HISTORY:  Miscarriage: 1 spontaneous 1st trimester miscarriage  Hypothyroid: S/P Radioactive iodine  Vertigo  H/O Bell's palsy: Left sided  HTN (hypertension)  HLD (hyperlipidemia)  History of hysteroscopy  S/P appendectomy      FAMILY HISTORY:  Thyroid diease: sister      Social History:  Tobacco: denies  ETOH: denies  Born in [place]    Outpatient Medications: nothing for her thryoid    MEDICATIONS  (STANDING):  atorvastatin 80 milliGRAM(s) Oral at bedtime  baclofen 5 milliGRAM(s) Oral three times a day  folic acid 1 milliGRAM(s) Oral daily  gabapentin 400 milliGRAM(s) Oral two times a day  hydrochlorothiazide 12.5 milliGRAM(s) Oral daily  levothyroxine 50 MICROGram(s) Oral <User Schedule>  losartan 100 milliGRAM(s) Oral daily  naproxen 500 milliGRAM(s) Oral two times a day  PARoxetine 30 milliGRAM(s) Oral daily    MEDICATIONS  (PRN):  acetaminophen   Tablet .. 650 milliGRAM(s) Oral every 6 hours PRN Mild Pain (1 - 3), Moderate Pain (4 - 6), Severe Pain (7 - 10)      Allergies  hydralazine-like drugs (Other)      Review of Systems:  Constitutional: No fever, +chronic chills  Eyes: No blurry vision, diplopia  Neuro: No tremors  HEENT: No pain  Cardiovascular: No chest pain, palpitations  Respiratory: No SOB, no cough  GI: No nausea, vomiting,   : No dysuria, hematuria  Endocrine: +cold intolerance, no heat intolerance  ALL OTHER SYSTEMS REVIEWED AND NEGATIVE      PHYSICAL EXAM:  VITALS: T(C): 36.6 (03-03-20 @ 13:47)  T(F): 97.9 (03-03-20 @ 13:47), Max: 98.6 (03-02-20 @ 21:22)  HR: 67 (03-03-20 @ 13:47) (66 - 71)  BP: 146/87 (03-03-20 @ 13:47) (131/81 - 169/94)  RR:  (18 - 18)  SpO2:  (96% - 98%)  Wt(kg): --  GENERAL: NAD, well-groomed, well-developed  EYES: No proptosis, anicteric  HEENT:  Atraumatic, Normocephalic, moist mucous membranes  THYROID: About 15g with bumpy texture  RESPIRATORY: Clear to auscultation bilaterally; No rales, rhonchi, wheezing, or rubs  CARDIOVASCULAR: Regular rate and rhythm; No murmurs; no peripheral edema  GI: Soft, nontender, non distended, normal bowel sounds  SKIN: Dry, intact, No rashes or lesions on feet b/l   NEURO: extraocular movements intact  PSYCH: reactive affect, euthymic mood            03-03    134<L>  |  97  |  25<H>  ----------------------------<  97  3.9   |  23  |  0.91    EGFR if : 77  EGFR if non : 66    Ca    9.3      03-03    TPro  7.7  /  Alb  4.2  /  TBili  0.4  /  DBili  x   /  AST  26  /  ALT  16  /  AlkPhos  117  03-01      Thyroid Function Tests:  03-03 @ 08:54 .00 FreeT4 0.2 T3 31       Hemoglobin A1C, Whole Blood: 6.0 % <H> [4.0 - 5.6] (02-28-20 @ 08:53)      02-28 Chol 289<H> <H> HDL 65 Trig 188<H>

## 2020-03-04 LAB
ACHR BIND AB SER-ACNC: <0.3 NMOL/L — SIGNIFICANT CHANGE UP
ACHR BLOCK AB SER-ACNC: <15 — SIGNIFICANT CHANGE UP
NON-GYNECOLOGICAL CYTOLOGY STUDY: SIGNIFICANT CHANGE UP

## 2020-03-04 PROCEDURE — 99232 SBSQ HOSP IP/OBS MODERATE 35: CPT | Mod: GC

## 2020-03-04 RX ORDER — BACLOFEN 100 %
10 POWDER (GRAM) MISCELLANEOUS THREE TIMES A DAY
Refills: 0 | Status: DISCONTINUED | OUTPATIENT
Start: 2020-03-04 | End: 2020-03-05

## 2020-03-04 RX ORDER — BENZTROPINE MESYLATE 1 MG
2 TABLET ORAL
Refills: 0 | Status: DISCONTINUED | OUTPATIENT
Start: 2020-03-04 | End: 2020-03-05

## 2020-03-04 RX ORDER — GABAPENTIN 400 MG/1
300 CAPSULE ORAL THREE TIMES A DAY
Refills: 0 | Status: DISCONTINUED | OUTPATIENT
Start: 2020-03-04 | End: 2020-03-05

## 2020-03-04 RX ADMIN — Medication 12.5 MILLIGRAM(S): at 07:05

## 2020-03-04 RX ADMIN — Medication 500 MILLIGRAM(S): at 19:10

## 2020-03-04 RX ADMIN — Medication 5 MILLIGRAM(S): at 07:05

## 2020-03-04 RX ADMIN — GABAPENTIN 400 MILLIGRAM(S): 400 CAPSULE ORAL at 07:05

## 2020-03-04 RX ADMIN — GABAPENTIN 400 MILLIGRAM(S): 400 CAPSULE ORAL at 18:14

## 2020-03-04 RX ADMIN — ENOXAPARIN SODIUM 40 MILLIGRAM(S): 100 INJECTION SUBCUTANEOUS at 11:39

## 2020-03-04 RX ADMIN — GABAPENTIN 300 MILLIGRAM(S): 400 CAPSULE ORAL at 21:13

## 2020-03-04 RX ADMIN — Medication 5 MILLIGRAM(S): at 13:54

## 2020-03-04 RX ADMIN — Medication 1 MILLIGRAM(S): at 12:11

## 2020-03-04 RX ADMIN — Medication 500 MILLIGRAM(S): at 06:05

## 2020-03-04 RX ADMIN — Medication 50 MICROGRAM(S): at 06:04

## 2020-03-04 RX ADMIN — Medication 500 MILLIGRAM(S): at 07:05

## 2020-03-04 RX ADMIN — Medication 10 MILLIGRAM(S): at 21:11

## 2020-03-04 RX ADMIN — Medication 30 MILLIGRAM(S): at 12:11

## 2020-03-04 RX ADMIN — Medication 2 MILLIGRAM(S): at 21:11

## 2020-03-04 RX ADMIN — Medication 500 MILLIGRAM(S): at 18:14

## 2020-03-04 RX ADMIN — LOSARTAN POTASSIUM 100 MILLIGRAM(S): 100 TABLET, FILM COATED ORAL at 07:05

## 2020-03-04 NOTE — PROGRESS NOTE ADULT - SUBJECTIVE AND OBJECTIVE BOX
SUBJECTIVE: C/o numbness in her fingertips. No headache    Vital Signs Last 24 Hrs  T(C): 36.4 (04 Mar 2020 13:34), Max: 36.8 (03 Mar 2020 17:12)  T(F): 97.6 (04 Mar 2020 13:34), Max: 98.2 (03 Mar 2020 17:12)  HR: 67 (04 Mar 2020 13:34) (65 - 73)  BP: 124/77 (04 Mar 2020 13:34) (124/77 - 160/84)  BP(mean): --  RR: 16 (04 Mar 2020 13:34) (16 - 18)  SpO2: 95% (04 Mar 2020 13:34) (95% - 98%)      Mental status: Awake, alert, oriented x3, speech fluent, follows commands, no neglect, normal memory   Cranial Nerves: mild, chronic left lower motor neuron facial palsy with synkinesis; no right facial palsy; no right ptosis, no nystagmus, no dysarthria,  tongue midline  Motor exam: Normal tone, no drift, 5/5 RUE, 5/5 RLE, 5/5 LUE, 5/5 LLE, slow fine finger movements.  Decreased ROM in neck F/E and rotation  Sensation: Intact to light touch   Coordination/ Gait: No dysmetria, gait wide based and unsteady- requires moderate assistance of 1 person    LABORATORY:  CBC   Chem 03-03    134<L>  |  97  |  25<H>  ----------------------------<  97  3.9   |  23  |  0.91    Ca    9.3      03 Mar 2020 06:53      Lipid Panel 02-28 Chol 289<H> <H> HDL 65 Trig 188<H>  A1c 02-28 [id number] 6.0    MEDICATIONS  (STANDING):  atorvastatin 80 milliGRAM(s) Oral at bedtime  baclofen 5 milliGRAM(s) Oral three times a day  enoxaparin Injectable 40 milliGRAM(s) SubCutaneous daily  folic acid 1 milliGRAM(s) Oral daily  gabapentin 400 milliGRAM(s) Oral two times a day  hydrochlorothiazide 12.5 milliGRAM(s) Oral daily  levothyroxine 50 MICROGram(s) Oral <User Schedule>  losartan 100 milliGRAM(s) Oral daily  naproxen 500 milliGRAM(s) Oral two times a day  PARoxetine 30 milliGRAM(s) Oral daily    MEDICATIONS  (PRN):  acetaminophen   Tablet .. 650 milliGRAM(s) Oral every 6 hours PRN Mild Pain (1 - 3), Moderate Pain (4 - 6), Severe Pain (7 - 10)        MRI Cervical Spine 3/3/2020    The cervical vertebrae are normal in height and signal intensity. No acute fractures or dislocations are identified. There are small disc osteophyte complexes at C5-6 and C6-7 causing mild ventral extradural defects. There is no cord compression. There is no intraspinal mass or collection. After contrast administration there is normal vascular and osseous enhancement. No abnormal parenchymal or leptomeningeal enhancement is identified. There is normal cord signal.      IMPRESSION: Mild degenerative changes. No compression. No abnormal signal or enhancement.

## 2020-03-04 NOTE — PROGRESS NOTE ADULT - SUBJECTIVE AND OBJECTIVE BOX
Subjective: Patient seen and examined. No new events except as noted.   s/p Lumbar tap   resting comfortably     REVIEW OF SYSTEMS:    CONSTITUTIONAL: + weakness, fevers or chills  EYES/ENT: No visual changes;  No vertigo or throat pain   NECK: No pain or stiffness  RESPIRATORY: No cough, wheezing, hemoptysis; No shortness of breath  CARDIOVASCULAR: No chest pain or palpitations  GASTROINTESTINAL: No abdominal or epigastric pain. No nausea, vomiting, or hematemesis; No diarrhea or constipation. No melena or hematochezia.  GENITOURINARY: No dysuria, frequency or hematuria  NEUROLOGICAL: No numbness or weakness  SKIN: No itching, burning, rashes, or lesions   All other review of systems is negative unless indicated above.    MEDICATIONS:  MEDICATIONS  (STANDING):  atorvastatin 80 milliGRAM(s) Oral at bedtime  baclofen 5 milliGRAM(s) Oral three times a day  enoxaparin Injectable 40 milliGRAM(s) SubCutaneous daily  folic acid 1 milliGRAM(s) Oral daily  gabapentin 400 milliGRAM(s) Oral two times a day  hydrochlorothiazide 12.5 milliGRAM(s) Oral daily  levothyroxine 50 MICROGram(s) Oral <User Schedule>  losartan 100 milliGRAM(s) Oral daily  naproxen 500 milliGRAM(s) Oral two times a day  PARoxetine 30 milliGRAM(s) Oral daily      PHYSICAL EXAM:  T(C): 36.7 (03-04-20 @ 09:08), Max: 36.8 (03-03-20 @ 11:21)  HR: 66 (03-04-20 @ 09:08) (65 - 73)  BP: 133/75 (03-04-20 @ 10:43) (125/76 - 160/84)  RR: 16 (03-04-20 @ 09:08) (16 - 18)  SpO2: 96% (03-04-20 @ 09:08) (96% - 98%)  Wt(kg): --  I&O's Summary    03 Mar 2020 07:01  -  04 Mar 2020 07:00  --------------------------------------------------------  IN: 730 mL / OUT: 900 mL / NET: -170 mL    04 Mar 2020 07:01  -  04 Mar 2020 10:50  --------------------------------------------------------  IN: 360 mL / OUT: 0 mL / NET: 360 mL          Appearance: Normal	  HEENT:   Normal oral mucosa, PERRL, EOMI	  Lymphatic: No lymphadenopathy , no edema  Cardiovascular: Normal S1 S2, No JVD, No murmurs , Peripheral pulses palpable 2+ bilaterally  Respiratory: Lungs clear to auscultation, normal effort 	  Gastrointestinal:  Soft, Non-tender, + BS	  Skin: No rashes, No ecchymoses, No cyanosis, warm to touch  Musculoskeletal: Normal range of motion, normal strength  Psychiatry:  Mood & affect appropriate  Ext: No edema      LABS:    CARDIAC MARKERS:      Culture - Fungal, CSF (03.03.20 @ 16:13)    Specimen Source: .CSF CSF    Culture Results:   Testing in progress    Culture - CSF with Gram Stain . (03.03.20 @ 16:13)    Gram Stain:   No polymorphonuclear cells seen per low power field  No organisms seen per oil power field    Specimen Source: .CSF CSF    Culture Results:   No growth          03-03    134<L>  |  97  |  25<H>  ----------------------------<  97  3.9   |  23  |  0.91    Ca    9.3      03 Mar 2020 06:53      proBNP:   Lipid Profile:   HgA1c:   TSH:             TELEMETRY: 	    ECG:  	  RADIOLOGY:   DIAGNOSTIC TESTING:  [ ] Echocardiogram:  [ ]  Catheterization:  [ ] Stress Test:    OTHER:

## 2020-03-04 NOTE — CHART NOTE - NSCHARTNOTEFT_GEN_A_CORE
Chart Note -  Nutrition Services    Consult received for "patient with newly diagnosed pre-diabetes."     Hospital course as per chart: Pt is a 64 yo female with PMH of HTN, HLD, DM, bell's palsy, vertigo, hypothyroidism, who presented with headache, subjective facial numbness and facial heaviness, vertical and horizontal diplopia, and gait dysequilibrium, admitted 1955. S/p LP (3/3). As per H&P, pt with PMH of DM, HbA1c now in pre-diabetic range (6% on 2/28).     Of note, pt seen by RD yesterday (3/3) and education provided to son-in-law and pt: "Discussed importance of balanced and consistent meal planning, inclusion of protein and vegetables and limiting intake of breads and crackers, cookies."     Pt visited, interview conducted using  (ID#[id number], Maria Victoria).     Reviewed nutrition therapy for pre-diabetes. Pt endorsing continued poor appetite, discussed provision of Regular diet in setting of poor-fair intake, then incorporating principles of consistent carbohydrate when appetite/intake improves. Reinforced reduction of concentrated sweets (juices, ice cream) and importance of lean protein, and foods with fiber (whole grains, fruit, vegetables). Answered pt's questions. Pt amenable to recommendations and made aware RD remains available.     RD remains available upon request and will follow up per protocol.   Rolanda Barreto, MS, RD, CDN Pager #[phone] Chart Note -  Nutrition Services    Consult received for "patient with newly diagnosed pre-diabetes."     Hospital course as per chart: Pt is a 66 yo female with PMH of HTN, HLD, DM, bell's palsy, vertigo, hypothyroidism, who presented with headache, subjective facial numbness and facial heaviness, vertical and horizontal diplopia, and gait dysequilibrium, admitted 1955. S/p LP (3/3). As per H&P, pt with PMH of DM, HbA1c now in pre-diabetic range (6% on 2/28).     Of note, pt seen by RD yesterday (3/3) and education provided to son-in-law and pt: "Discussed importance of balanced and consistent meal planning, inclusion of protein and vegetables and limiting intake of breads and crackers, cookies."     Pt visited, interview conducted using  (ID#[id number], Maria Victoria).     Reviewed nutrition therapy for pre-diabetes. Pt endorsing continued poor appetite, discussed provision of Regular diet in setting of poor-fair intake, then incorporating principles of consistent carbohydrate when appetite/intake improves. Pt able to identify foods high in sugar. Reinforced reduction of concentrated sweets (juices, ice cream) and importance of lean protein, and foods with fiber (whole grains, fruit, vegetables). Answered pt's questions. Pt amenable to recommendations and made aware RD remains available.     RD remains available upon request and will follow up per protocol.   Rolanda Barreto MS, RD, CDN Pager #[phone]

## 2020-03-04 NOTE — PROGRESS NOTE ADULT - ASSESSMENT
Generalized weakness and blurry vision likely in setting of severe hypothyroidism. Painful spastic neck muscles with facial and head numbness indicative of possible torticollis, improving with PT and muscle relaxer. MRI C spine was negative for acute pathology, LP was also unrevealing with negative infectious workup.     Plan:  [x] levothyroxine 50mcg daily per Endo (, free T4 0.2, T3 31)  [x] MRI C-spine- no cord pathology   [x] losartan 100mg qd  [x] ESR, CRP, Rheum factor negative, MARIA L elevated  [x] Torticollis with occipital neuralgia - Heat packs TIDS, Baclofen 5mg TID, Naproxen 500mg TID  [x] s/p LP by IR w/ 1 cell, protein 52, glucose 65, gram stain/PCR neg, culture pending    [x] ASA/Plavix and Lovenox  [x] f/u AChR Ab - binding, blocking, modulating; anti-Gq1b Ab  [x] MRI Brain WAW - no acute infarct, or abnormal enhancement  [x] PT/OT -CHADD pending bed availability Generalized weakness and blurry vision likely in setting of severe hypothyroidism. Painful spastic neck muscles with facial and head numbness indicative of possible torticollis, improving with PT and muscle relaxer. MRI C spine was negative for acute pathology, LP was also unrevealing with negative infectious workup.     Plan:  [x] levothyroxine 50mcg daily per Endo (, free T4 0.2, T3 31)  [x] MRI C-spine- no cord pathology   [x] losartan 100mg qd  [x] ESR, CRP, Rheum factor negative, MARIA L elevated  [x] Torticollis with occipital neuralgia - Heat packs TIDS, Baclofen 5mg TID, Naproxen 500mg TID  [] trial benztropine 2mg BID for possible dystonic reaction to psych medications.   [x] s/p LP by IR w/ 1 cell, protein 52, glucose 65, gram stain/PCR neg, culture pending    [x] ASA/Plavix and Lovenox  [x] f/u AChR Ab - binding, blocking, modulating; anti-Gq1b Ab  [x] MRI Brain WAW - no acute infarct, or abnormal enhancement  [x] PT/OT -CHADD pending bed availability

## 2020-03-05 ENCOUNTER — TRANSCRIPTION ENCOUNTER (OUTPATIENT)
Age: 65
End: 2020-03-05

## 2020-03-05 VITALS
HEART RATE: 72 BPM | OXYGEN SATURATION: 98 % | DIASTOLIC BLOOD PRESSURE: 75 MMHG | SYSTOLIC BLOOD PRESSURE: 117 MMHG | RESPIRATION RATE: 18 BRPM | TEMPERATURE: 98 F

## 2020-03-05 PROCEDURE — 86043 ACETYLCHOLN RCPTR MODLG ANTB: CPT

## 2020-03-05 PROCEDURE — 84238 ASSAY NONENDOCRINE RECEPTOR: CPT

## 2020-03-05 PROCEDURE — 86038 ANTINUCLEAR ANTIBODIES: CPT

## 2020-03-05 PROCEDURE — 87483 CNS DNA AMP PROBE TYPE 12-25: CPT

## 2020-03-05 PROCEDURE — 86431 RHEUMATOID FACTOR QUANT: CPT

## 2020-03-05 PROCEDURE — 89051 BODY FLUID CELL COUNT: CPT

## 2020-03-05 PROCEDURE — 87102 FUNGUS ISOLATION CULTURE: CPT

## 2020-03-05 PROCEDURE — 86235 NUCLEAR ANTIGEN ANTIBODY: CPT

## 2020-03-05 PROCEDURE — 80061 LIPID PANEL: CPT

## 2020-03-05 PROCEDURE — 87070 CULTURE OTHR SPECIMN AEROBIC: CPT

## 2020-03-05 PROCEDURE — 70496 CT ANGIOGRAPHY HEAD: CPT

## 2020-03-05 PROCEDURE — 99285 EMERGENCY DEPT VISIT HI MDM: CPT | Mod: 25

## 2020-03-05 PROCEDURE — 72156 MRI NECK SPINE W/O & W/DYE: CPT

## 2020-03-05 PROCEDURE — 97535 SELF CARE MNGMENT TRAINING: CPT

## 2020-03-05 PROCEDURE — 85610 PROTHROMBIN TIME: CPT

## 2020-03-05 PROCEDURE — 84484 ASSAY OF TROPONIN QUANT: CPT

## 2020-03-05 PROCEDURE — 71045 X-RAY EXAM CHEST 1 VIEW: CPT

## 2020-03-05 PROCEDURE — 88108 CYTOPATH CONCENTRATE TECH: CPT

## 2020-03-05 PROCEDURE — 93306 TTE W/DOPPLER COMPLETE: CPT

## 2020-03-05 PROCEDURE — 97162 PT EVAL MOD COMPLEX 30 MIN: CPT

## 2020-03-05 PROCEDURE — 93005 ELECTROCARDIOGRAM TRACING: CPT

## 2020-03-05 PROCEDURE — 80053 COMPREHEN METABOLIC PANEL: CPT

## 2020-03-05 PROCEDURE — 97530 THERAPEUTIC ACTIVITIES: CPT

## 2020-03-05 PROCEDURE — 86803 HEPATITIS C AB TEST: CPT

## 2020-03-05 PROCEDURE — A9585: CPT

## 2020-03-05 PROCEDURE — 97165 OT EVAL LOW COMPLEX 30 MIN: CPT

## 2020-03-05 PROCEDURE — 86140 C-REACTIVE PROTEIN: CPT

## 2020-03-05 PROCEDURE — 82945 GLUCOSE OTHER FLUID: CPT

## 2020-03-05 PROCEDURE — 83036 HEMOGLOBIN GLYCOSYLATED A1C: CPT

## 2020-03-05 PROCEDURE — 84480 ASSAY TRIIODOTHYRONINE (T3): CPT

## 2020-03-05 PROCEDURE — 70450 CT HEAD/BRAIN W/O DYE: CPT

## 2020-03-05 PROCEDURE — 70553 MRI BRAIN STEM W/O & W/DYE: CPT

## 2020-03-05 PROCEDURE — 97110 THERAPEUTIC EXERCISES: CPT

## 2020-03-05 PROCEDURE — 62328 DX LMBR SPI PNXR W/FLUOR/CT: CPT

## 2020-03-05 PROCEDURE — 86042 ACETYLCHOLN RCPTR BLCKG ANTB: CPT

## 2020-03-05 PROCEDURE — 87205 SMEAR GRAM STAIN: CPT

## 2020-03-05 PROCEDURE — 81001 URINALYSIS AUTO W/SCOPE: CPT

## 2020-03-05 PROCEDURE — 85652 RBC SED RATE AUTOMATED: CPT

## 2020-03-05 PROCEDURE — 85027 COMPLETE CBC AUTOMATED: CPT

## 2020-03-05 PROCEDURE — 99239 HOSP IP/OBS DSCHRG MGMT >30: CPT | Mod: GC

## 2020-03-05 PROCEDURE — 97116 GAIT TRAINING THERAPY: CPT

## 2020-03-05 PROCEDURE — 80048 BASIC METABOLIC PNL TOTAL CA: CPT

## 2020-03-05 PROCEDURE — 85730 THROMBOPLASTIN TIME PARTIAL: CPT

## 2020-03-05 PROCEDURE — 96374 THER/PROPH/DIAG INJ IV PUSH: CPT | Mod: XU

## 2020-03-05 PROCEDURE — 84443 ASSAY THYROID STIM HORMONE: CPT

## 2020-03-05 PROCEDURE — 83520 IMMUNOASSAY QUANT NOS NONAB: CPT

## 2020-03-05 PROCEDURE — 84439 ASSAY OF FREE THYROXINE: CPT

## 2020-03-05 PROCEDURE — 84157 ASSAY OF PROTEIN OTHER: CPT

## 2020-03-05 RX ORDER — BACLOFEN 100 %
1 POWDER (GRAM) MISCELLANEOUS
Qty: 0 | Refills: 0 | DISCHARGE
Start: 2020-03-05

## 2020-03-05 RX ORDER — LOSARTAN POTASSIUM 100 MG/1
1 TABLET, FILM COATED ORAL
Qty: 0 | Refills: 0 | DISCHARGE

## 2020-03-05 RX ORDER — ZOLPIDEM TARTRATE 10 MG/1
1 TABLET ORAL
Qty: 0 | Refills: 0 | DISCHARGE

## 2020-03-05 RX ORDER — LEVOTHYROXINE SODIUM 125 MCG
1 TABLET ORAL
Qty: 0 | Refills: 0 | DISCHARGE
Start: 2020-03-05

## 2020-03-05 RX ORDER — SODIUM CHLORIDE 0.65 %
1 AEROSOL, SPRAY (ML) NASAL
Refills: 0 | Status: DISCONTINUED | OUTPATIENT
Start: 2020-03-05 | End: 2020-03-05

## 2020-03-05 RX ORDER — RISPERIDONE 4 MG/1
1 TABLET ORAL
Qty: 0 | Refills: 0 | DISCHARGE

## 2020-03-05 RX ORDER — CYCLOBENZAPRINE HYDROCHLORIDE 10 MG/1
1 TABLET, FILM COATED ORAL
Qty: 0 | Refills: 0 | DISCHARGE

## 2020-03-05 RX ORDER — LOSARTAN POTASSIUM 100 MG/1
1 TABLET, FILM COATED ORAL
Qty: 0 | Refills: 0 | DISCHARGE
Start: 2020-03-05

## 2020-03-05 RX ADMIN — LOSARTAN POTASSIUM 100 MILLIGRAM(S): 100 TABLET, FILM COATED ORAL at 06:41

## 2020-03-05 RX ADMIN — Medication 50 MICROGRAM(S): at 06:42

## 2020-03-05 RX ADMIN — Medication 10 MILLIGRAM(S): at 06:42

## 2020-03-05 RX ADMIN — Medication 2 MILLIGRAM(S): at 10:40

## 2020-03-05 RX ADMIN — GABAPENTIN 300 MILLIGRAM(S): 400 CAPSULE ORAL at 13:31

## 2020-03-05 RX ADMIN — Medication 30 MILLIGRAM(S): at 13:31

## 2020-03-05 RX ADMIN — Medication 1 MILLIGRAM(S): at 11:28

## 2020-03-05 RX ADMIN — Medication 500 MILLIGRAM(S): at 06:41

## 2020-03-05 RX ADMIN — ENOXAPARIN SODIUM 40 MILLIGRAM(S): 100 INJECTION SUBCUTANEOUS at 11:25

## 2020-03-05 RX ADMIN — GABAPENTIN 300 MILLIGRAM(S): 400 CAPSULE ORAL at 06:41

## 2020-03-05 RX ADMIN — Medication 10 MILLIGRAM(S): at 13:31

## 2020-03-05 RX ADMIN — Medication 12.5 MILLIGRAM(S): at 06:42

## 2020-03-05 NOTE — PROGRESS NOTE ADULT - ASSESSMENT
Generalized weakness and blurry vision likely in setting of severe hypothyroidism. Painful spastic neck muscles with facial and head numbness indicative of possible torticollis, improving with PT and muscle relaxer. MRI C spine was negative for acute pathology, LP was also unrevealing with negative infectious workup.     Plan:  [] Trial of cogentin 2 BID  [x] Torticollis with occipital neuralgia - Heat packs TIDS, Baclofen 10mg TID, Naproxen 500mg TID Gabapentin 300 TID,   [x] levothyroxine 50mcg daily per Endo (, free T4 0.2, T3 31)  [x] MRI C-spine- no cord pathology   [x] losartan 100mg qd  [x] ESR, CRP, Rheum factor negative, MARIA L elevated  [x] s/p LP by IR w/ 1 cell, protein 52, glucose 65, gram stain/PCR neg, culture pending    [x] ASA/Plavix and Lovenox  [x] f/u AChR Ab - binding, blocking, modulating; anti-Gq1b Ab  [x] MRI Brain WAW - no acute infarct, or abnormal enhancement  [x] PT/OT -CHADD pending bed availability Generalized weakness and blurry vision likely in setting of severe hypothyroidism. Painful spastic neck muscles with facial and head numbness indicative of possible torticollis, improving with PT and muscle relaxer. Risperidone has been associated with muscle stiffness. MRI C spine was negative for acute pathology, LP was also unrevealing with negative infectious workup.     Plan:  [x] Trial of cogentin 2 BID  [x] Torticollis with occipital neuralgia - Heat packs TIDS, Baclofen 10mg TID, Naproxen 500mg TID Gabapentin 300 TID,   [x] levothyroxine 50mcg daily per Endo (, free T4 0.2, T3 31)  [x] MRI Brain, C-spine- neg  [x] losartan 100mg qd  [x] ESR, CRP, Rheum factor negative, MARIA L elevated  [x] f/u AChR Ab - binding, blocking, modulating; anti-Gq1b Ab  [x] s/p LP by IR w/ 1 cell, protein 52, glucose 65, gram stain/PCR neg, culture pending    [x] ASA/Plavix and Lovenox  [x] PT/OT -CHADD pending bed availability  [ ] outpatient f/u with Psych for medication regimen adjustment

## 2020-03-05 NOTE — DISCHARGE NOTE PROVIDER - NSFOLLOWUPCLINICS_GEN_ALL_ED_FT
[facility] Pulmonolgy and Sleep Medicine  Pulmonology  [address] [place] [address]  Phone: [phone]  Fax:   Follow Up Time: [facility] Pulmonolgy and Sleep Medicine  Pulmonology  [address], [address]  [place] [address]  Phone: [phone]  Fax:     [facility] Psychiatry  Psychiatry  [address] [place] [address]  Phone: [phone]  Fax:   Follow Up Time:

## 2020-03-05 NOTE — DISCHARGE NOTE NURSING/CASE MANAGEMENT/SOCIAL WORK - PATIENT PORTAL LINK FT
You can access the FollowMyHealth Patient Portal offered by [facility] by registering at the following website: [email or web address]. By joining [employer]’s FollowMyHealth portal, you will also be able to view your health information using other applications (apps) compatible with our system.

## 2020-03-05 NOTE — PROGRESS NOTE ADULT - PROBLEM SELECTOR PLAN 2
stable   cont with meds

## 2020-03-05 NOTE — DISCHARGE NOTE PROVIDER - NSDCCPCAREPLAN_GEN_ALL_CORE_FT
PRINCIPAL DISCHARGE DIAGNOSIS  Diagnosis: Hypothyroidism  Assessment and Plan of Treatment: Please follow up with Endocrinology within 1 month of discharge to check your TSH and make and necessary medication adjustments      SECONDARY DISCHARGE DIAGNOSES  Diagnosis: Torticollis  Assessment and Plan of Treatment: continue physical therapy PRINCIPAL DISCHARGE DIAGNOSIS  Diagnosis: Hypothyroidism  Assessment and Plan of Treatment: Please follow up with Endocrinology within 1 month of discharge to check your TSH and make and necessary medication adjustments      SECONDARY DISCHARGE DIAGNOSES  Diagnosis: PARIS (obstructive sleep apnea)  Assessment and Plan of Treatment: Please follow up outpatient with Pulmonary/Sleep medicine for evaluation    Diagnosis: Torticollis  Assessment and Plan of Treatment: continue physical therapy

## 2020-03-05 NOTE — DISCHARGE NOTE PROVIDER - CARE PROVIDER_API CALL
Michaela Oliveira)  EndocrinologyMetabDiabetes  [address] [address]  Phone: [phone]  Fax: [phone]  Follow Up Time: Michaela Oliveira)  EndocrinologyMetabDiabetes  [address] [address]  Phone: [phone]  Fax: [phone]  Follow Up Time:     Nissenbaum, Michael A (MD)  Neurology  [address] [facility] [address]  [place] [address]  Phone: [phone]  Fax: [phone]  Follow Up Time: Michaela Oliveira)  EndocrinologyMetabDiabetes  [address] [address]  Phone: [phone]  Fax: [phone]  Follow Up Time:     Aisha Lowe)  Neurology  [address], [address]  [place] [address]  Phone: [phone]  Fax: [phone]  Established Patient  Follow Up Time:

## 2020-03-05 NOTE — DISCHARGE NOTE PROVIDER - NSDCMRMEDTOKEN_GEN_ALL_CORE_FT
Ambien 5 mg oral tablet: 1 tab(s) orally once a day (at bedtime)  cyclobenzaprine 10 mg oral tablet: 1 tab(s) orally once a day (at bedtime)  folic acid 1 mg oral tablet: 1 tab(s) orally once a day  gabapentin 400 mg oral capsule: 1 cap(s) orally 2 times a day  hydroCHLOROthiazide 12.5 mg oral capsule: 1 cap(s) orally once a day  LORazepam 0.5 mg oral tablet: 1 tab(s) orally once a day (at bedtime), As Needed for anxiety/sleep  losartan 50 mg oral tablet: 1 tab(s) orally once a day  Paxil 30 mg oral tablet: 1 tab(s) orally once a day  risperiDONE 2 mg oral tablet: 1 tab(s) orally once a day Ambien 5 mg oral tablet: 1 tab(s) orally once a day (at bedtime), As Needed  baclofen 10 mg oral tablet: 1 tab(s) orally 3 times a day  folic acid 1 mg oral tablet: 1 tab(s) orally once a day  gabapentin 400 mg oral capsule: 1 cap(s) orally 2 times a day  hydroCHLOROthiazide 12.5 mg oral capsule: 1 cap(s) orally once a day  levothyroxine 50 mcg (0.05 mg) oral tablet: 1 tab(s) orally daily, take on empty stomach before breakfast.  LORazepam 0.5 mg oral tablet: 1 tab(s) orally once a day (at bedtime), As Needed for anxiety/sleep  losartan 100 mg oral tablet: 1 tab(s) orally once a day  naproxen 500 mg oral tablet: 1 tab(s) orally 2 times a day. Stop taking 3/8/2020  Paxil 30 mg oral tablet: 1 tab(s) orally once a day

## 2020-03-05 NOTE — DISCHARGE NOTE PROVIDER - HOSPITAL COURSE
65Y F with PMH migraines, hypothyroidism s/p radioactive iodine presented for increasing generalized weakness as well as numbness involving the back of her head and most of her R side. She was initially admitted to stroke Neurology service given risk factors for CVA. MRI brain was negative for acute stroke and her symptoms did not appear to be related to cerebral dysfunction on exam. She was then transferred to general Neurology service where she had an LP performed for further investigation for possible inflammatory or autoimmune causes for her symptoms. Her CSF was unremarkable. Of note, her neck was noted to be very stiff and she had significant decreased neck ROM. MRI C-spine was unremarkable. Her TSH was found to be significantly elevated at 114 and patient states she has not been taking synthroid at home. Endocrine recommended starting levothyroxine 50mcg daily and will follow up with her Endocrinologist in 1 month to recheck her levels. She was treated with Baclofen 10mg TID, Naproxen 500mg TID Gabapentin 300 TID for her stiffness with minimal improvement. She has history of prolonged Risperidone use which may be contributing to her stiffness as her weakness and blurry vision is likely in the setting of her severe hypothyroidism.  She will follow up outpatient with her Psychiatrist for further medication adjustments.         PT evaluated the patient and recommended CHADD upon discharge.     Neurologically stable for discharge to rehab today.            MRI Cervical Spine 3/3/2020    The cervical vertebrae are normal in height and signal intensity. No acute fractures or dislocations are identified. There are small disc osteophyte complexes at C5-6 and C6-7 causing mild ventral extradural defects. There is no cord compression. There is no intraspinal mass or collection. After contrast administration there is normal vascular and osseous enhancement. No abnormal parenchymal or leptomeningeal enhancement is identified. There is normal cord signal.    IMPRESSION: Mild degenerative changes. No compression. No abnormal signal or enhancement.        MRI Brain: 3/2/2020    No acute intracranial pathology or abnormal enhancement. Scattered small vessel white matter ischemic changes. No acute infarcts, hemorrhage or mass. 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 She was initially admitted to stroke Neurology service given risk factors for CVA. MRI brain was negative for acute stroke and her symptoms did not appear to be related to cerebral dysfunction on exam. She was then transferred to general Neurology service where she had an LP performed for further investigation for possible inflammatory or autoimmune causes for her symptoms. Her CSF was unremarkable. Of note, her neck was noted to be very stiff and she had significant decreased neck ROM. MRI C-spine was unremarkable. She was treated with Baclofen 10mg TID, Naproxen 500mg TID Gabapentin 300 TID for her stiffness with minimal improvement. Her TSH was found to be significantly elevated at 114, free T4 0.2, T3 31 and patient states she has not been taking synthroid at home. Endocrine recommended starting levothyroxine 50mcg daily and will follow up with her Endocrinologist in 1 month to recheck her levels. She has history of prolonged Risperidone use which may be contributing to her neck stiffness/ torticollis and her weakness and blurry vision is likely in the setting of her severe hypothyroidism.  She will follow up outpatient with her Psychiatrist for further medication adjustments.     Of note, patient is clinically suspected to have obstructive sleep apnea and referral for Pulmonary/Sleep Medicine was provided for outpatient follow up.         PT evaluated the patient and recommended CHADD upon discharge.     Neurologically stable for discharge to rehab today.            MRI Cervical Spine 3/3/2020    The cervical vertebrae are normal in height and signal intensity. No acute fractures or dislocations are identified. There are small disc osteophyte complexes at C5-6 and C6-7 causing mild ventral extradural defects. There is no cord compression. There is no intraspinal mass or collection. After contrast administration there is normal vascular and osseous enhancement. No abnormal parenchymal or leptomeningeal enhancement is identified. There is normal cord signal.    IMPRESSION: Mild degenerative changes. No compression. No abnormal signal or enhancement.        MRI Brain: 3/2/2020    No acute intracranial pathology or abnormal enhancement. Scattered small vessel white matter ischemic changes. No acute infarcts, hemorrhage or mass. 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 She was initially admitted to stroke Neurology service given risk factors for CVA. MRI brain was negative for acute stroke and her symptoms did not appear to be related to cerebral dysfunction on exam. She was then transferred to general Neurology service where she had an LP performed for further investigation for possible inflammatory or autoimmune causes for her symptoms. Her CSF was unremarkable. Of note, her neck was noted to be very stiff and she had significant decreased neck ROM. MRI C-spine was unremarkable. She was treated with Baclofen 10mg TID, Naproxen 500mg TID Gabapentin 300 TID for her stiffness with minimal improvement. Her TSH was found to be significantly elevated at 114, free T4 0.2, T3 31 and patient states she has not been taking synthroid at home. Endocrine recommended starting levothyroxine 50mcg daily and will follow up with her Endocrinologist in 1 month to recheck her levels. She has history of prolonged Risperidone use which may be contributing to her neck stiffness/ torticollis and her weakness and blurry vision is likely in the setting of her severe hypothyroidism.  She will follow up outpatient with her Psychiatrist for further medication adjustments.     Of note, patient is clinically suspected to have obstructive sleep apnea and referral for Pulmonary/Sleep Medicine was provided for outpatient follow up.             MRI Cervical Spine 3/3/2020    The cervical vertebrae are normal in height and signal intensity. No acute fractures or dislocations are identified. There are small disc osteophyte complexes at C5-6 and C6-7 causing mild ventral extradural defects. There is no cord compression. There is no intraspinal mass or collection. After contrast administration there is normal vascular and osseous enhancement. No abnormal parenchymal or leptomeningeal enhancement is identified. There is normal cord signal.    IMPRESSION: Mild degenerative changes. No compression. No abnormal signal or enhancement.        MRI Brain: 3/2/2020    No acute intracranial pathology or abnormal enhancement. Scattered small vessel white matter ischemic changes. No acute infarcts, hemorrhage or mass.        PT evaluated the patient and recommended CHADD upon discharge.     Neurologically stable for discharge to rehab with close outpt follow up with endocrinology, psychiatry, sleep medicine, and neurology. 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 She was initially admitted to stroke Neurology service given risk factors for CVA. MRI brain was negative for acute stroke and her symptoms did not appear to be related to cerebral dysfunction on exam. She was then transferred to general Neurology service where she had an LP performed for further investigation for possible inflammatory or autoimmune causes for her symptoms. Her CSF was unremarkable. Of note, her neck was noted to be very stiff and she had significant decreased neck ROM. MRI C-spine was unremarkable. She was treated with Baclofen 10mg TID, Naproxen 500mg TID Gabapentin 300 TID for her stiffness with minimal improvement. Her TSH was found to be significantly elevated at 114, free T4 0.2, T3 31 and patient states she has not been taking synthroid at home. Endocrine recommended starting levothyroxine 50mcg daily and will follow up with her Endocrinologist in 1 month to recheck her levels. She has history of prolonged Risperidone use which may be contributing to her neck stiffness/ torticollis and her weakness and blurry vision is likely in the setting of her severe hypothyroidism.  She will follow up outpatient with her Psychiatrist for further medication adjustments.     Of note, patient is clinically suspected to have obstructive sleep apnea and referral for Pulmonary/Sleep Medicine was provided for outpatient follow up.         MRI Cervical Spine 3/3/2020    The cervical vertebrae are normal in height and signal intensity. No acute fractures or dislocations are identified. There are small disc osteophyte complexes at C5-6 and C6-7 causing mild ventral extradural defects. There is no cord compression. There is no intraspinal mass or collection. After contrast administration there is normal vascular and osseous enhancement. No abnormal parenchymal or leptomeningeal enhancement is identified. There is normal cord signal.    IMPRESSION: Mild degenerative changes. No compression. No abnormal signal or enhancement.        MRI Brain: 3/2/2020    No acute intracranial pathology or abnormal enhancement. Scattered small vessel white matter ischemic changes. No acute infarcts, hemorrhage or mass.        Thyroid Stimulating Hormone, Serum: 114.00: Test Repeated uIU/mL     Free Thyroxine, Serum: 0.2 ng/dL     Triiodothyronine, Total (T3 Total): 31 ng/dL             PT evaluated the patient and recommended CHADD upon discharge.     Neurologically stable for discharge to rehab with close outpt follow up with endocrinology, psychiatry, sleep medicine, and neurology. 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 She was initially admitted to stroke Neurology service given risk factors for CVA. MRI brain was negative for acute stroke and her symptoms did not appear to be related to cerebral dysfunction on exam. She was then transferred to general Neurology service where she had an LP performed for further investigation for possible inflammatory or autoimmune causes for her symptoms. Her CSF was unremarkable. Of note, her neck was noted to be very stiff and she had significant decreased neck ROM. MRI C-spine was unremarkable. She was treated with Baclofen 10mg TID, Naproxen 500mg TID Gabapentin 300 TID for her stiffness with minimal improvement. Her TSH was found to be significantly elevated at 114, free T4 0.2, T3 31 and patient states she has not been taking synthroid at home. Endocrine recommended starting levothyroxine 50mcg daily and will follow up with her Endocrinologist in 1 month to recheck her levels. She has history of prolonged Risperidone use which may be contributing to her neck stiffness/ torticollis and her weakness and blurry vision is likely in the setting of her severe hypothyroidism.  She will follow up outpatient with her Psychiatrist for further medication adjustments.     Of note, patient is clinically suspected to have obstructive sleep apnea and referral for Pulmonary/Sleep Medicine was provided for outpatient follow up.         MRI Cervical Spine 3/3/2020    The cervical vertebrae are normal in height and signal intensity. No acute fractures or dislocations are identified. There are small disc osteophyte complexes at C5-6 and C6-7 causing mild ventral extradural defects. There is no cord compression. There is no intraspinal mass or collection. After contrast administration there is normal vascular and osseous enhancement. No abnormal parenchymal or leptomeningeal enhancement is identified. There is normal cord signal.    IMPRESSION: Mild degenerative changes. No compression. No abnormal signal or enhancement.        MRI Brain: 3/2/2020    No acute intracranial pathology or abnormal enhancement. Scattered small vessel white matter ischemic changes. No acute infarcts, hemorrhage or mass.        Thyroid Stimulating Hormone, Serum: 114.00: Test Repeated uIU/mL     Free Thyroxine, Serum: 0.2 ng/dL     Triiodothyronine, Total (T3 Total): 31 ng/dL             PT evaluated the patient and recommended CHADD upon discharge.     Neurologically stable for discharge to rehab with close outpt follow up with endocrinology, psychiatry, sleep medicine, and neurology.        - Patient's/Family's questions and concerns addressed. They voiced understanding.

## 2020-03-05 NOTE — DISCHARGE NOTE PROVIDER - PROVIDER TOKENS
PROVIDER:[TOKEN:[[id number]]] PROVIDER:[TOKEN:[[id number]]],PROVIDER:[TOKEN:[[id number]]] PROVIDER:[TOKEN:[[id number]]],PROVIDER:[TOKEN:[[id number]],ESTABLISHEDPATIENT:[T]]

## 2020-03-05 NOTE — PROGRESS NOTE ADULT - PROBLEM SELECTOR PLAN 3
Awaiting spinal tap   neurology following
Awaiting spinal tap with IR  neurology following
orders per neurology team
s/p spinal tap with IR  follow up results   neurology following
s/p spinal tap with IR  follow up results   neurology following

## 2020-03-05 NOTE — PROGRESS NOTE ADULT - REASON FOR ADMISSION
R/O stroke

## 2020-03-05 NOTE — PROGRESS NOTE ADULT - SUBJECTIVE AND OBJECTIVE BOX
Subjective: Patient seen and examined. No new events except as noted.   no cp or sob   still stiff     REVIEW OF SYSTEMS:    CONSTITUTIONAL:+ weakness, fevers or chills  EYES/ENT: No visual changes;  No vertigo or throat pain   NECK: No pain or stiffness  RESPIRATORY: No cough, wheezing, hemoptysis; No shortness of breath  CARDIOVASCULAR: No chest pain or palpitations  GASTROINTESTINAL: No abdominal or epigastric pain. No nausea, vomiting, or hematemesis; No diarrhea or constipation. No melena or hematochezia.  GENITOURINARY: No dysuria, frequency or hematuria  NEUROLOGICAL: No numbness or weakness  SKIN: No itching, burning, rashes, or lesions   All other review of systems is negative unless indicated above.    MEDICATIONS:  MEDICATIONS  (STANDING):  baclofen 10 milliGRAM(s) Oral three times a day  benztropine 2 milliGRAM(s) Oral two times a day  enoxaparin Injectable 40 milliGRAM(s) SubCutaneous daily  folic acid 1 milliGRAM(s) Oral daily  gabapentin 300 milliGRAM(s) Oral three times a day  hydrochlorothiazide 12.5 milliGRAM(s) Oral daily  levothyroxine 50 MICROGram(s) Oral <User Schedule>  losartan 100 milliGRAM(s) Oral daily  naproxen 500 milliGRAM(s) Oral two times a day  PARoxetine 30 milliGRAM(s) Oral daily  sodium chloride 0.65% Nasal 1 Spray(s) Both Nostrils two times a day      PHYSICAL EXAM:  T(C): 36.4 (03-05-20 @ 11:02), Max: 36.8 (03-04-20 @ 17:10)  HR: 63 (03-05-20 @ 11:02) (63 - 67)  BP: 117/75 (03-05-20 @ 11:02) (117/75 - 138/76)  RR: 18 (03-05-20 @ 11:02) (16 - 18)  SpO2: 94% (03-05-20 @ 11:02) (94% - 97%)  Wt(kg): --  I&O's Summary    04 Mar 2020 07:01  -  05 Mar 2020 07:00  --------------------------------------------------------  IN: 1200 mL / OUT: 1500 mL / NET: -300 mL          Appearance: Normal	  HEENT:   Normal oral mucosa, PERRL, EOMI	  Lymphatic: No lymphadenopathy , no edema  Cardiovascular: Normal S1 S2, No JVD, No murmurs , Peripheral pulses palpable 2+ bilaterally  Respiratory: Lungs clear to auscultation, normal effort 	  Gastrointestinal:  Soft, Non-tender, + BS	  Skin: No rashes, No ecchymoses, No cyanosis, warm to touch  Musculoskeletal: Normal range of motion, normal strength  Psychiatry:  Mood & affect appropriate  Ext: No edema      LABS:    CARDIAC MARKERS:                  proBNP:   Lipid Profile:   HgA1c:   TSH:             TELEMETRY: 	    ECG:  	  RADIOLOGY:   DIAGNOSTIC TESTING:  [ ] Echocardiogram:  [ ]  Catheterization:  [ ] Stress Test:    OTHER:

## 2020-03-05 NOTE — PROGRESS NOTE ADULT - ATTENDING COMMENTS
PATIENT SEEN AND EXAMINED WITH HOUSESTAFF ON 3/1/2020.    PLEASE SEE MY SEPARATE NOTE FOR DETAILS.
patient seen and examined with housestaff on 2/29/2020    Please see my separate encounter note.
Pt seen and examined on rounds with team.
Pt seen and examined on rounds
Pt seen and examined on rounds with neurology team. Pt with stiffness, decreased ROM of her neck, feels sensation of pressure or tightness when moving her neck or eyes. Sounds meningeal in nature. Pending LP under IR, would also like to image her c-spine s some of the sensations could localize to c1/c2.
Advanced care planning was discussed with patient and family.  Advanced care planning forms were reviewed and discussed as appropriate.  Differential diagnosis and plan of care discussed with patient after the evaluation.   Pain assessed and judicious use of narcotics when appropriate was discussed.  Importance of Fall prevention discussed.  Counseling on Smoking and Alcohol cessation was offered when appropriate.  Counseling on Diet, exercise, and medication compliance was done.
Pt seen and examined on rounds. TSH very high, likely as a result of radioablation of her thyroid.   Still pending C-spine MRI.
agree with above; ROS otherwise negative
Advanced care planning was discussed with patient and family.  Advanced care planning forms were reviewed and discussed as appropriate.  Differential diagnosis and plan of care discussed with patient after the evaluation.   Pain assessed and judicious use of narcotics when appropriate was discussed.  Importance of Fall prevention discussed.  Counseling on Smoking and Alcohol cessation was offered when appropriate.  Counseling on Diet, exercise, and medication compliance was done.
Local with sedation

## 2020-03-05 NOTE — PROGRESS NOTE ADULT - SUBJECTIVE AND OBJECTIVE BOX
SUBJECTIVE: no acute events reported    MEDICATIONS  (STANDING):  baclofen 10 milliGRAM(s) Oral three times a day  benztropine 2 milliGRAM(s) Oral two times a day  enoxaparin Injectable 40 milliGRAM(s) SubCutaneous daily  folic acid 1 milliGRAM(s) Oral daily  gabapentin 300 milliGRAM(s) Oral three times a day  hydrochlorothiazide 12.5 milliGRAM(s) Oral daily  levothyroxine 50 MICROGram(s) Oral <User Schedule>  losartan 100 milliGRAM(s) Oral daily  naproxen 500 milliGRAM(s) Oral two times a day  PARoxetine 30 milliGRAM(s) Oral daily    MEDICATIONS  (PRN):  acetaminophen   Tablet .. 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  Decreased ROM in neck F/E and rotation  Sensation: Intact to light touch   Coordination/ Gait: No dysmetria, gait wide based and unsteady- requires moderate assistance of 1 person        MRI Cervical Spine 3/3/2020    The cervical vertebrae are normal in height and signal intensity. No acute fractures or dislocations are identified. There are small disc osteophyte complexes at C5-6 and C6-7 causing mild ventral extradural defects. There is no cord compression. There is no intraspinal mass or collection. After contrast administration there is normal vascular and osseous enhancement. No abnormal parenchymal or leptomeningeal enhancement is identified. There is normal cord signal.      IMPRESSION: Mild degenerative changes. No compression. No abnormal signal or enhancement.    < from: MR Head w/wo IV Cont (02.27.20 @ 18:34) >  IMPRESSION:     No acute intracranial pathology or abnormal enhancement. Scattered small vessel white matter ischemic changes. No acute infarcts, hemorrhage or mass.    < end of copied text > SUBJECTIVE: no acute events reported, still with significant stiffness.   ID # [id number] used.     MEDICATIONS  (STANDING):  baclofen 10 milliGRAM(s) Oral three times a day  benztropine 2 milliGRAM(s) Oral two times a day  enoxaparin Injectable 40 milliGRAM(s) SubCutaneous daily  folic acid 1 milliGRAM(s) Oral daily  gabapentin 300 milliGRAM(s) Oral three times a day  hydrochlorothiazide 12.5 milliGRAM(s) Oral daily  levothyroxine 50 MICROGram(s) Oral <User Schedule>  losartan 100 milliGRAM(s) Oral daily  naproxen 500 milliGRAM(s) Oral two times a day  PARoxetine 30 milliGRAM(s) Oral daily    MEDICATIONS  (PRN):  acetaminophen   Tablet .. 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  Decreased ROM in neck F/E and rotation. More ROM on L rotation.   Sensation: Intact to light touch   Coordination/ Gait: No dysmetria, gait wide based and unsteady- requires moderate assistance of 1 person        MRI Cervical Spine 3/3/2020    The cervical vertebrae are normal in height and signal intensity. No acute fractures or dislocations are identified. There are small disc osteophyte complexes at C5-6 and C6-7 causing mild ventral extradural defects. There is no cord compression. There is no intraspinal mass or collection. After contrast administration there is normal vascular and osseous enhancement. No abnormal parenchymal or leptomeningeal enhancement is identified. There is normal cord signal.      IMPRESSION: Mild degenerative changes. No compression. No abnormal signal or enhancement.    MRI Brain: 3/2/2020  No acute intracranial pathology or abnormal enhancement. Scattered small vessel white matter ischemic changes. No acute infarcts, hemorrhage or mass.

## 2020-03-05 NOTE — DISCHARGE NOTE PROVIDER - NSDCHC_MEDRECSTATUS_GEN_ALL_CORE
Admission Reconciliation is Not Complete  Discharge Reconciliation is Not Complete mariah cross son Admission Reconciliation is Completed  Discharge Reconciliation is Completed Henry chawla

## 2020-03-06 LAB
CULTURE RESULTS: SIGNIFICANT CHANGE UP
GANGLIOSIDE GQ1B IGG ANTIBODY RESULT: SIGNIFICANT CHANGE UP TITER
SPECIMEN SOURCE: SIGNIFICANT CHANGE UP

## 2020-03-07 LAB — ACHR MOD AB SER-ACNC: <1 — SIGNIFICANT CHANGE UP

## 2020-04-01 LAB
CULTURE RESULTS: SIGNIFICANT CHANGE UP
SPECIMEN SOURCE: SIGNIFICANT CHANGE UP

## 2020-08-01 ENCOUNTER — OUTPATIENT (OUTPATIENT)
Dept: OUTPATIENT SERVICES | Facility: HOSPITAL | Age: 65
LOS: 1 days | End: 2020-08-01
Payer: MEDICAID

## 2020-08-01 DIAGNOSIS — Z98.890 OTHER SPECIFIED POSTPROCEDURAL STATES: Chronic | ICD-10-CM

## 2020-08-01 DIAGNOSIS — Z90.49 ACQUIRED ABSENCE OF OTHER SPECIFIED PARTS OF DIGESTIVE TRACT: Chronic | ICD-10-CM

## 2020-08-01 PROCEDURE — G9005: CPT

## 2020-08-14 DIAGNOSIS — Z71.89 OTHER SPECIFIED COUNSELING: ICD-10-CM

## 2020-08-15 PROBLEM — Z86.69 PERSONAL HISTORY OF OTHER DISEASES OF THE NERVOUS SYSTEM AND SENSE ORGANS: Chronic | Status: ACTIVE | Noted: 2020-02-27

## 2020-08-15 PROBLEM — E78.5 HYPERLIPIDEMIA, UNSPECIFIED: Chronic | Status: ACTIVE | Noted: 2020-02-26

## 2020-08-15 PROBLEM — R42 DIZZINESS AND GIDDINESS: Chronic | Status: ACTIVE | Noted: 2020-02-27

## 2020-08-15 PROBLEM — I10 ESSENTIAL (PRIMARY) HYPERTENSION: Chronic | Status: ACTIVE | Noted: 2020-02-27

## 2020-08-15 PROBLEM — O03.9 COMPLETE OR UNSPECIFIED SPONTANEOUS ABORTION WITHOUT COMPLICATION: Chronic | Status: ACTIVE | Noted: 2020-02-27

## 2020-08-15 PROBLEM — E03.9 HYPOTHYROIDISM, UNSPECIFIED: Chronic | Status: ACTIVE | Noted: 2020-02-27

## 2022-04-02 NOTE — PATIENT PROFILE ADULT - NSASFUNCLEVELADLAMBULATE_GEN_A_NUR
71M p/w left facial droop and LLE weakness since 8AM today.  Recent right MCA stent.  Exam as above, VSS.  Concerned for ischemic stroke vs aneurysm vs ICH.  Code stroke initiated at triage.  Will obtain labs, CT, EKG.  Neurology and NSx at bedside.  Will reassess and admit pending consultant recs and results.
40.1
3 = assistive equipment and person

## 2022-10-28 NOTE — OCCUPATIONAL THERAPY INITIAL EVALUATION ADULT - ADL RETRAINING, OT EVAL
at least 3+/5 throughout GOAL: Patient will be independent with UB dressing within 4 weeks GOAL: Pt will perform LB dressing independently in 4 weeks GOAL: Patient will perform grooming standing sink level independently in 4 weeks

## 2023-04-05 NOTE — PROGRESS NOTE ADULT - SUBJECTIVE AND OBJECTIVE BOX
Addendum  created 04/05/23 0704 by Myron Dos Santos MD    Attestation recorded in Intraprocedure (Perfusion), Intraprocedure Attestations filed (Perfusion)       NEUROLOGY PROGRESS NOTE    SUBJECTIVE: No acute events reported.    Vital Signs Last 24 Hrs  T(C): 36.5 (03 Mar 2020 05:08), Max: 37 (02 Mar 2020 21:22)  T(F): 97.7 (03 Mar 2020 05:08), Max: 98.6 (02 Mar 2020 21:22)  HR: 66 (03 Mar 2020 06:23) (66 - 73)  BP: 148/74 (03 Mar 2020 06:23) (131/81 - 169/94)  BP(mean): --  RR: 18 (03 Mar 2020 05:08) (18 - 18)  SpO2: 97% (03 Mar 2020 05:08) (96% - 98%)    Mental status: Awake, alert, oriented x3, speech fluent, follows commands, no neglect, normal memory   Cranial Nerves: mild, chronic left lower motor neuron facial palsy with synkinesis; no right facial palsy; no right ptosis, no nystagmus, no dysarthria,  tongue midline  Motor exam: Normal tone, no drift, 5/5 RUE, 5/5 RLE, 5/5 LUE, 5/5 LLE, normal fine finger movements.  Sensation: Intact to light touch   Coordination/ Gait: No dysmetria, gait wide based and unsteady- requires moderate assistance of 1 person    	     PTT - ( 02 Mar 2020 09:38 )  PTT:30.3 sec  Lipid Panel 02-28 Chol 289<H> <H> HDL 65 Trig 188<H>  A1c 02-28 [id number] 6.0    Cardiac enzymes     U/A   MEDICATIONS  (STANDING):  atorvastatin 80 milliGRAM(s) Oral at bedtime  baclofen 5 milliGRAM(s) Oral three times a day  folic acid 1 milliGRAM(s) Oral daily  gabapentin 400 milliGRAM(s) Oral two times a day  hydrochlorothiazide 12.5 milliGRAM(s) Oral daily  losartan 50 milliGRAM(s) Oral daily  PARoxetine 30 milliGRAM(s) Oral daily    MEDICATIONS  (PRN):  acetaminophen   Tablet .. 650 milliGRAM(s) Oral every 6 hours PRN Mild Pain (1 - 3), Moderate Pain (4 - 6), Severe Pain (7 - 10) NEUROLOGY PROGRESS NOTE    SUBJECTIVE: No acute events reported. Had 2 loose bowels overnight but NAD. Hypertensive pattern    Vital Signs Last 24 Hrs  T(C): 36.5 (03 Mar 2020 05:08), Max: 37 (02 Mar 2020 21:22)  T(F): 97.7 (03 Mar 2020 05:08), Max: 98.6 (02 Mar 2020 21:22)  HR: 66 (03 Mar 2020 06:23) (66 - 73)  BP: 148/74 (03 Mar 2020 06:23) (131/81 - 169/94)  BP(mean): --  RR: 18 (03 Mar 2020 05:08) (18 - 18)  SpO2: 97% (03 Mar 2020 05:08) (96% - 98%)    Mental status: Awake, alert, oriented x3, speech fluent, follows commands, no neglect, normal memory   Cranial Nerves: mild, chronic left lower motor neuron facial palsy with synkinesis; no right facial palsy; no right ptosis, no nystagmus, no dysarthria,  tongue midline  Motor exam: Normal tone, no drift, 5/5 RUE, 5/5 RLE, 5/5 LUE, 5/5 LLE, normal fine finger movements.  Sensation: Intact to light touch   Coordination/ Gait: No dysmetria, gait wide based and unsteady- requires moderate assistance of 1 person    	     PTT - ( 02 Mar 2020 09:38 )  PTT:30.3 sec  Lipid Panel 02-28 Chol 289<H> <H> HDL 65 Trig 188<H>  A1c 02-28 [id number] 6.0    Cardiac enzymes     U/A   MEDICATIONS  (STANDING):  atorvastatin 80 milliGRAM(s) Oral at bedtime  baclofen 5 milliGRAM(s) Oral three times a day  folic acid 1 milliGRAM(s) Oral daily  gabapentin 400 milliGRAM(s) Oral two times a day  hydrochlorothiazide 12.5 milliGRAM(s) Oral daily  losartan 50 milliGRAM(s) Oral daily  PARoxetine 30 milliGRAM(s) Oral daily    MEDICATIONS  (PRN):  acetaminophen   Tablet .. 650 milliGRAM(s) Oral every 6 hours PRN Mild Pain (1 - 3), Moderate Pain (4 - 6), Severe Pain (7 - 10) NEUROLOGY PROGRESS NOTE    SUBJECTIVE: No acute events reported. Had 2 loose bowels overnight but NAD.    Vital Signs Last 24 Hrs  T(C): 36.5 (03 Mar 2020 05:08), Max: 37 (02 Mar 2020 21:22)  T(F): 97.7 (03 Mar 2020 05:08), Max: 98.6 (02 Mar 2020 21:22)  HR: 66 (03 Mar 2020 06:23) (66 - 73)  BP: 148/74 (03 Mar 2020 06:23) (131/81 - 169/94)  BP(mean): --  RR: 18 (03 Mar 2020 05:08) (18 - 18)  SpO2: 97% (03 Mar 2020 05:08) (96% - 98%)    neck: decreased ROM   Mental status: Awake, alert, oriented x3, speech fluent, follows commands, no neglect, normal memory   Cranial Nerves: mild, chronic left lower motor neuron facial palsy with synkinesis; no right facial palsy; no right ptosis, no nystagmus, no dysarthria,  tongue midline  Motor exam: Normal tone, no drift, 5/5 RUE, 5/5 RLE, 5/5 LUE, 5/5 LLE, normal fine finger movements.  Sensation: Intact to light touch   Coordination/ Gait: No dysmetria, gait wide based and unsteady- requires moderate assistance of 1 person    	     PTT - ( 02 Mar 2020 09:38 )  PTT:30.3 sec  Lipid Panel 02-28 Chol 289<H> <H> HDL 65 Trig 188<H>  A1c 02-28 [id number] 6.0    Cardiac enzymes     U/A   MEDICATIONS  (STANDING):  atorvastatin 80 milliGRAM(s) Oral at bedtime  baclofen 5 milliGRAM(s) Oral three times a day  folic acid 1 milliGRAM(s) Oral daily  gabapentin 400 milliGRAM(s) Oral two times a day  hydrochlorothiazide 12.5 milliGRAM(s) Oral daily  losartan 50 milliGRAM(s) Oral daily  PARoxetine 30 milliGRAM(s) Oral daily    MEDICATIONS  (PRN):  acetaminophen   Tablet .. 650 milliGRAM(s) Oral every 6 hours PRN Mild Pain (1 - 3), Moderate Pain (4 - 6), Severe Pain (7 - 10)

## 2023-05-09 NOTE — PHYSICAL THERAPY INITIAL EVALUATION ADULT - PHYSICAL ASSIST/NONPHYSICAL ASSIST: SUPINE/SIT, REHAB EVAL
May 9, 2023      Leticia Rausch  [address] [address]          Dear Ms. Rausch:    Nori Byrd DO has ordered a EGD for you and we would like to schedule that procedure. Our attempts to reach you by phone have been unsuccessful.    Please call Adeline CASANOVA [phone] at your earliest convenience. Let the  know that your paperwork is started, and that you are calling to arrange a date and time for the procedure.      If you have any questions or concerns, we would be more than happy to discuss them with you. We look forward to assisting you with your health care needs.      Sincerely,  Adeline CASANOVA [phone]  Surgery Scheduler for Nori Byrd DO  [facility] Pre-Admit Department     1 person assist/supervision

## 2023-07-10 NOTE — ED ADULT TRIAGE NOTE - PRO INTERPRETER NEED 2
Initial SW/CM Assessment/Plan of Care Note     Baseline Assessment  85 year old admitted 7/8/2023 as Inpatient with a diagnosis of V-Fib arrest.   Prior to admission patient was living with Spouse and residing at House    .  Patient does not  have a Power of  for Healthcare.  Document is not activated.  Agent is NONE. Patient has a Legal Guardian, designated guardian is NONE. Patient’s Primary Care Provider is Reinaldo Hwang MD.     Progress Note  Patient extubated, alert. Post cardiac cath  Independent from home with spouse. SW to follow for needs.     Plan  Patient/Family Discharge Goal: Home, Home Care   Is patient/family goal achievable: Yes    SW/CM - Recommendations for Discharge: Home care, Home   PT - Recommendations for Discharge:      Last Filed Values     None        OT - Recommendations for Discharge:      Last Filed Values     None        SLP - Recommendations for Discharge:    Last Filed Values     None          Barriers to Discharge  Identified Barriers to Discharge/Transition Planning: NONE  Anticipate patient will need post-hospital services. Necessary services are available.  Anticipate patient can return to the environment from which patient entered the hospital.   Anticipate patient can provide self-care at discharge.    Refer to SW/CM Flowsheet for objective data.     Medical History  Past Medical History:   Diagnosis Date   • AF (atrial fibrillation) (CMD)    • Bladder cancer (CMD)    • Essential (primary) hypertension    • High cholesterol    • Malignant neoplasm (CMD)        Prior to Admission Status  Functional Status  Ambulation: Independent/Self  Bathing: Independent/Self  Dressing: Independent/Self  Toileting: Independent/Self  Meal Preparation: Independent/Self  Shopping: Independent/Self  Medication Preparation: Independent/Self  Medication Administration: Independent/Self  Housekeeping: Independent/Self  Laundry: Independent/Self  Transportation:  Independent/Self    Agency/Support  Type of Services Prior to Hospitalization: None ,   ,   ,   ,    ,     Support Systems: Family members, Friends   Home Devices/Equipment: None ,   ,    ,      Mobility Assist Devices: None  Sensory Support Devices: None      Current Status  PT Ambulation Tips:    PT Transfer Tips:     OT Bathing Tips:    OT Dressing Tips:    OT Toileting Tips:    OT Feeding Tips:    SLP Swallow/Feeding Tips:    SLP Comm/Cog Tips:    Current Mental Status: Alert, Oriented to Person, Oriented to Place  Stressors:      Insurance  Primary: MEDICARE  Secondary: [facility] [employer]    Disposition Recommendations:  SW/CM recommendation for discharge: Home care, Home          Sudanese

## 2023-08-28 NOTE — PHYSICAL THERAPY INITIAL EVALUATION ADULT - PERTINENT HX OF CURRENT PROBLEM, REHAB EVAL
64 y/o F PMH of HTN, HLD, DM, L. sided bell's palsy, vertigo, hypothyroidism S/P radioactive Iodine presenting with s/o HA, subjective facial numbness and facial heaviness, vertical and horizontal diplopia unclear if binocular or monocular, and gait dysequilibrium. SEE BELOW [ Donor] :  donor [Thymoglobulin] : thymoglobulin [Steroids] : steroids [Positive/Positive] : Donor Positive/Recipient Positive [] : Yes [Antibody Mediated Rejection] : antibody mediated rejection [PHS Increased Risk] : no Public Health Service increased risk [Brain Death] : brain death [Terminal Creatinine: ____] : Terminal Creatinine: [unfilled] [KDPI: ____] : Kidney Donor Profile Index: [unfilled] [Cold Ischemic Time: ____] : Cold Ischemic Time: [unfilled] [FreeTextEntry4] : 52M 51 y/o M with past medical history significant for HTN, CHF (s/p AICD 2016), CAD (s/p CABG 2015), IDDM, PVD s/p stent x4 in RLE with R big toe/second toe amputation (2021) and ESRD on HD via LUE AVF for 6 years now s/p DDRT 7/16/2023 with Thymoglobulin induction. Post-transplant course complicated by severe constipation requiring disimpaction, DGF requiring hemodialysis and LUE swelling with concern for L subclavian thrombus, now s/p IR fistulogram 7/7/23 with balloon angioplasty of subclavian vein  Readmitted with UTI and [facility] Hospital course-- - Found to have Klebsiella UTI, transplant ID consulted, completed 7 day course of antibiotics (Ancef sensitive) last dose 8/16/23 - Blood cultures remained with NGTD, still with ureteral stent in place - DGF: Last HD was on 8/8/23 with improvement in graft function - Constipation: Lactulose, enemas, senna/miralax given with return of bowel function - DSAs send 8/7/23 positive >5000 MFI - IR renal transplant biopsy on 8/9/23 with findings of [facility], C4d staining negative, early diabetic nephropathy (donor related) - IV Steroids with Solu-medrol 500mg x1 (8/8/23) to Prednisone 20mg daily - S/P PLEX/IVIG x5 sessions (first session 8/11, last PLEX 8/18, last IVIF 8/19) - Rituximab s/p PLEX on 8/18 - Anemia of chronic disease: s/p 1u RBC 8/17 - LUE swelling noted 8/18 (where LUE AVF is) --> LUE duplex w/ persistent left subclavian DVT noted but patient wishes to address this with his own vascular surgeon - Creatinine on day of discharge 1.79 [de-identified] : Patient has been doing well at home. Has intermittent swelling of LUE, currently taking eliquis. Due to see vascular surgeon in next week or two. Denies any fevers, abdominal pain, nausea, diarrhea, or dysuria.  He thinks there has been mild hematuria over past few days.  Last Cr 1.9, currently taking Envarsus 3mg

## 2023-10-27 NOTE — PATIENT PROFILE ADULT - FLU SEASON?
"[facility] GERIATRICS    Chief Complaint   Patient presents with    RECHECK     HPI:  Ben Salvador is a 89 year old  (1/14/1928), who is being seen today for an episodic care visit at: [facility]) [[id number]]. Today's concern is: Patient resting in bed upon exam. RN accompanied writer to assess anxiety level, sleeping and unable to arouse. Appears comfortable and without anxiety or pain. Little to no oral intake over several days. Enrolled with [facility] with goals of care directed at pain control and comfort. 24/7 hour care has been provided by family. No further concerns at this time.     BP Readings from Last 3 Encounters:   10/13/23 111/59   09/22/23 111/59   08/31/23 (!) 145/76     Pulse Readings from Last 4 Encounters:   10/13/23 52   09/22/23 52   08/31/23 96   04/26/23 (!) 44     Wt Readings from Last 4 Encounters:   10/27/23 51.7 kg (114 lb)   10/13/23 52.1 kg (114 lb 12.8 oz)   09/22/23 51.7 kg (114 lb)   08/31/23 52.1 kg (114 lb 12.8 oz)       Allergies, and PMH/PSH reviewed in EPIC today.  REVIEW OF SYSTEMS:  Patient sleeping and non-verbal     Objective:   Ht 1.448 m (4' 9\")   Wt 51.7 kg (114 lb)   BMI 24.67 kg/m    A & O x 3, NAD. Lungs CTA, non labored. RRR, S1/S2 w/o murmur,gallop or rub.  no edema.  Abdomen soft, nontender, +BT'S. No focal neurological deficits.        Recent labs in EPIC reviewed by me today.     Assessment/Plan:  1. Failure to thrive in adult: ongoing, little to no intake. Enrolled with hospice. Continue with plan of care no changes at this time, adjustment as needed     2. Hospice care patient: Enrolled with [facility], POC focused on pain control and comfort.    3. Anxiety: managed on ativan at this time. No change       MED REC REQUIRED  Post Medication Reconciliation Status: patient was not discharged from an inpatient facility or TCU      Orders:  No new orders    Electronically signed by: Alix Tarango NP        "
Yes...

## 2023-11-03 ENCOUNTER — APPOINTMENT (OUTPATIENT)
Dept: OBGYN | Facility: CLINIC | Age: 68
End: 2023-11-03
Payer: MEDICARE

## 2023-11-03 VITALS
HEIGHT: 62 IN | SYSTOLIC BLOOD PRESSURE: 170 MMHG | DIASTOLIC BLOOD PRESSURE: 81 MMHG | HEART RATE: 86 BPM | BODY MASS INDEX: 40.48 KG/M2 | WEIGHT: 220 LBS

## 2023-11-03 DIAGNOSIS — Z01.419 ENCOUNTER FOR GYNECOLOGICAL EXAMINATION (GENERAL) (ROUTINE) W/OUT ABNORMAL FINDINGS: ICD-10-CM

## 2023-11-03 DIAGNOSIS — R92.2 INCONCLUSIVE MAMMOGRAM: ICD-10-CM

## 2023-11-03 DIAGNOSIS — R92.30 INCONCLUSIVE MAMMOGRAM: ICD-10-CM

## 2023-11-03 PROCEDURE — 99203 OFFICE O/P NEW LOW 30 MIN: CPT | Mod: 25

## 2023-11-03 PROCEDURE — S0613: CPT

## 2023-11-03 RX ORDER — CHROMIUM 200 MCG
TABLET ORAL
Refills: 0 | Status: ACTIVE | COMMUNITY

## 2023-11-13 LAB
APPEARANCE: CLEAR
BACTERIA UR CULT: NORMAL
BACTERIA: ABNORMAL /HPF
BILIRUBIN URINE: NEGATIVE
BLOOD URINE: NEGATIVE
CAST: 13 /LPF
COLOR: NORMAL
CYTOLOGY CVX/VAG DOC THIN PREP: NORMAL
EPITHELIAL CELLS: 7 /HPF
GLUCOSE QUALITATIVE U: NEGATIVE MG/DL
HPV HIGH+LOW RISK DNA PNL CVX: NOT DETECTED
HYALINE CASTS: PRESENT
KETONES URINE: ABNORMAL MG/DL
LEUKOCYTE ESTERASE URINE: ABNORMAL
MICROSCOPIC-UA: NORMAL
NITRITE URINE: NEGATIVE
PH URINE: 6
PROTEIN URINE: 30 MG/DL
RED BLOOD CELLS URINE: NORMAL /HPF
REVIEW: NORMAL
SPECIFIC GRAVITY URINE: >1.03
UROBILINOGEN URINE: 1 MG/DL
WHITE BLOOD CELLS URINE: 1 /HPF

## 2023-11-21 ENCOUNTER — OUTPATIENT (OUTPATIENT)
Dept: OUTPATIENT SERVICES | Facility: HOSPITAL | Age: 68
LOS: 1 days | End: 2023-11-21
Payer: COMMERCIAL

## 2023-11-21 ENCOUNTER — APPOINTMENT (OUTPATIENT)
Dept: RADIOLOGY | Facility: HOSPITAL | Age: 68
End: 2023-11-21

## 2023-11-21 DIAGNOSIS — Z00.8 ENCOUNTER FOR OTHER GENERAL EXAMINATION: ICD-10-CM

## 2023-11-21 DIAGNOSIS — Z98.890 OTHER SPECIFIED POSTPROCEDURAL STATES: Chronic | ICD-10-CM

## 2023-11-21 DIAGNOSIS — Z90.49 ACQUIRED ABSENCE OF OTHER SPECIFIED PARTS OF DIGESTIVE TRACT: Chronic | ICD-10-CM

## 2023-11-21 PROCEDURE — 71046 X-RAY EXAM CHEST 2 VIEWS: CPT

## 2023-11-21 PROCEDURE — 71046 X-RAY EXAM CHEST 2 VIEWS: CPT | Mod: 26

## 2023-11-28 ENCOUNTER — APPOINTMENT (OUTPATIENT)
Dept: UROGYNECOLOGY | Facility: CLINIC | Age: 68
End: 2023-11-28
Payer: MEDICARE

## 2023-11-28 VITALS
WEIGHT: 220 LBS | DIASTOLIC BLOOD PRESSURE: 80 MMHG | HEIGHT: 64 IN | SYSTOLIC BLOOD PRESSURE: 169 MMHG | HEART RATE: 86 BPM | BODY MASS INDEX: 37.56 KG/M2

## 2023-11-28 LAB
BILIRUB UR QL STRIP: NORMAL
CLARITY UR: CLEAR
COLLECTION METHOD: NORMAL
GLUCOSE UR-MCNC: NORMAL
HCG UR QL: 0.2 EU/DL
HGB UR QL STRIP.AUTO: NORMAL
KETONES UR-MCNC: NORMAL
LEUKOCYTE ESTERASE UR QL STRIP: NORMAL
NITRITE UR QL STRIP: NORMAL
PH UR STRIP: 6.5
PROT UR STRIP-MCNC: NORMAL
SP GR UR STRIP: 1.02

## 2023-11-28 PROCEDURE — 99204 OFFICE O/P NEW MOD 45 MIN: CPT | Mod: 25

## 2023-11-28 PROCEDURE — 51701 INSERT BLADDER CATHETER: CPT

## 2023-11-28 RX ORDER — SOLIFENACIN SUCCINATE 5 MG/1
5 TABLET ORAL
Qty: 30 | Refills: 5 | Status: ACTIVE | COMMUNITY
Start: 2023-11-28 | End: 1900-01-01

## 2023-11-29 DIAGNOSIS — Z86.59 PERSONAL HISTORY OF OTHER MENTAL AND BEHAVIORAL DISORDERS: ICD-10-CM

## 2023-11-29 DIAGNOSIS — Z86.79 PERSONAL HISTORY OF OTHER DISEASES OF THE CIRCULATORY SYSTEM: ICD-10-CM

## 2023-11-29 DIAGNOSIS — Z78.9 OTHER SPECIFIED HEALTH STATUS: ICD-10-CM

## 2023-11-29 DIAGNOSIS — Z86.39 PERSONAL HISTORY OF OTHER ENDOCRINE, NUTRITIONAL AND METABOLIC DISEASE: ICD-10-CM

## 2023-11-29 DIAGNOSIS — Z82.49 FAMILY HISTORY OF ISCHEMIC HEART DISEASE AND OTHER DISEASES OF THE CIRCULATORY SYSTEM: ICD-10-CM

## 2023-11-29 DIAGNOSIS — Z82.5 FAMILY HISTORY OF ASTHMA AND OTHER CHRONIC LOWER RESPIRATORY DISEASES: ICD-10-CM

## 2023-11-29 DIAGNOSIS — Z83.42 FAMILY HISTORY OF FAMILIAL HYPERCHOLESTEROLEMIA: ICD-10-CM

## 2023-11-29 DIAGNOSIS — Z83.3 FAMILY HISTORY OF DIABETES MELLITUS: ICD-10-CM

## 2023-11-29 DIAGNOSIS — Z81.8 FAMILY HISTORY OF OTHER MENTAL AND BEHAVIORAL DISORDERS: ICD-10-CM

## 2023-11-29 DIAGNOSIS — Z83.49 FAMILY HISTORY OF OTHER ENDOCRINE, NUTRITIONAL AND METABOLIC DISEASES: ICD-10-CM

## 2023-11-29 DIAGNOSIS — Z87.19 PERSONAL HISTORY OF OTHER DISEASES OF THE DIGESTIVE SYSTEM: ICD-10-CM

## 2023-11-29 RX ORDER — GABAPENTIN 400 MG/1
400 CAPSULE ORAL
Refills: 0 | Status: ACTIVE | COMMUNITY

## 2023-11-29 RX ORDER — LOSARTAN POTASSIUM 50 MG/1
50 TABLET, FILM COATED ORAL
Refills: 0 | Status: ACTIVE | COMMUNITY

## 2023-11-29 RX ORDER — HYDROXYCHLOROQUINE SULFATE 200 MG/1
200 TABLET, FILM COATED ORAL
Refills: 0 | Status: ACTIVE | COMMUNITY

## 2023-11-29 RX ORDER — CYCLOBENZAPRINE HYDROCHLORIDE 10 MG/1
10 TABLET, FILM COATED ORAL
Refills: 0 | Status: ACTIVE | COMMUNITY

## 2023-11-29 RX ORDER — AMLODIPINE BESYLATE 5 MG/1
TABLET ORAL
Refills: 0 | Status: ACTIVE | COMMUNITY

## 2023-11-29 RX ORDER — LEVOTHYROXINE SODIUM 125 UG/1
125 CAPSULE ORAL
Refills: 0 | Status: ACTIVE | COMMUNITY

## 2023-11-30 ENCOUNTER — NON-APPOINTMENT (OUTPATIENT)
Age: 68
End: 2023-11-30

## 2023-11-30 LAB — BACTERIA UR CULT: NORMAL

## 2023-12-08 LAB
APPEARANCE: CLEAR
BACTERIA: NEGATIVE /HPF
BILIRUBIN URINE: NEGATIVE
BLOOD URINE: NEGATIVE
CAST: 0 /LPF
COLOR: YELLOW
EPITHELIAL CELLS: 3 /HPF
GLUCOSE QUALITATIVE U: NEGATIVE MG/DL
KETONES URINE: NEGATIVE MG/DL
LEUKOCYTE ESTERASE URINE: NEGATIVE
MICROSCOPIC-UA: NORMAL
NITRITE URINE: NEGATIVE
PH URINE: 6.5
PROTEIN URINE: 30 MG/DL
RED BLOOD CELLS URINE: 2 /HPF
SPECIFIC GRAVITY URINE: 1.02
UROBILINOGEN URINE: 0.2 MG/DL
WHITE BLOOD CELLS URINE: 0 /HPF

## 2024-01-23 ENCOUNTER — NON-APPOINTMENT (OUTPATIENT)
Age: 69
End: 2024-01-23

## 2024-01-23 ENCOUNTER — APPOINTMENT (OUTPATIENT)
Dept: UROGYNECOLOGY | Facility: CLINIC | Age: 69
End: 2024-01-23
Payer: MEDICARE

## 2024-01-23 ENCOUNTER — OUTPATIENT (OUTPATIENT)
Dept: OUTPATIENT SERVICES | Facility: HOSPITAL | Age: 69
LOS: 1 days | End: 2024-01-23
Payer: COMMERCIAL

## 2024-01-23 VITALS
SYSTOLIC BLOOD PRESSURE: 145 MMHG | BODY MASS INDEX: 38.98 KG/M2 | DIASTOLIC BLOOD PRESSURE: 90 MMHG | WEIGHT: 220 LBS | HEIGHT: 63 IN

## 2024-01-23 DIAGNOSIS — R35.0 FREQUENCY OF MICTURITION: ICD-10-CM

## 2024-01-23 DIAGNOSIS — Z90.49 ACQUIRED ABSENCE OF OTHER SPECIFIED PARTS OF DIGESTIVE TRACT: Chronic | ICD-10-CM

## 2024-01-23 DIAGNOSIS — Z01.818 ENCOUNTER FOR OTHER PREPROCEDURAL EXAMINATION: ICD-10-CM

## 2024-01-23 DIAGNOSIS — Z98.890 OTHER SPECIFIED POSTPROCEDURAL STATES: Chronic | ICD-10-CM

## 2024-01-23 LAB
BILIRUB UR QL STRIP: NEGATIVE
CLARITY UR: CLEAR
COLLECTION METHOD: NORMAL
GLUCOSE UR-MCNC: NEGATIVE
HCG UR QL: 0.2 EU/DL
HGB UR QL STRIP.AUTO: NORMAL
KETONES UR-MCNC: NEGATIVE
LEUKOCYTE ESTERASE UR QL STRIP: NEGATIVE
NITRITE UR QL STRIP: NEGATIVE
PH UR STRIP: 6
PROT UR STRIP-MCNC: NEGATIVE
SP GR UR STRIP: 1.02

## 2024-01-23 PROCEDURE — 51797 INTRAABDOMINAL PRESSURE TEST: CPT

## 2024-01-23 PROCEDURE — 81003 URINALYSIS AUTO W/O SCOPE: CPT | Mod: QW

## 2024-01-23 PROCEDURE — 51784 ANAL/URINARY MUSCLE STUDY: CPT

## 2024-01-23 PROCEDURE — 51729 CYSTOMETROGRAM W/VP&UP: CPT

## 2024-01-23 PROCEDURE — 51784 ANAL/URINARY MUSCLE STUDY: CPT | Mod: 26

## 2024-01-23 PROCEDURE — 51729 CYSTOMETROGRAM W/VP&UP: CPT | Mod: 26

## 2024-01-23 PROCEDURE — 51797 INTRAABDOMINAL PRESSURE TEST: CPT | Mod: 26

## 2024-01-24 ENCOUNTER — OUTPATIENT (OUTPATIENT)
Dept: OUTPATIENT SERVICES | Facility: HOSPITAL | Age: 69
LOS: 1 days | End: 2024-01-24
Payer: COMMERCIAL

## 2024-01-24 ENCOUNTER — APPOINTMENT (OUTPATIENT)
Dept: ULTRASOUND IMAGING | Facility: CLINIC | Age: 69
End: 2024-01-24

## 2024-01-24 DIAGNOSIS — Z00.8 ENCOUNTER FOR OTHER GENERAL EXAMINATION: ICD-10-CM

## 2024-01-24 DIAGNOSIS — Z90.49 ACQUIRED ABSENCE OF OTHER SPECIFIED PARTS OF DIGESTIVE TRACT: Chronic | ICD-10-CM

## 2024-01-24 PROCEDURE — 76775 US EXAM ABDO BACK WALL LIM: CPT

## 2024-01-24 PROCEDURE — 76856 US EXAM PELVIC COMPLETE: CPT

## 2024-01-24 PROCEDURE — 76856 US EXAM PELVIC COMPLETE: CPT | Mod: 26

## 2024-01-24 PROCEDURE — 76775 US EXAM ABDO BACK WALL LIM: CPT | Mod: 26

## 2024-01-25 ENCOUNTER — NON-APPOINTMENT (OUTPATIENT)
Age: 69
End: 2024-01-25

## 2024-01-25 LAB
APPEARANCE: CLEAR
BACTERIA: NEGATIVE /HPF
BILIRUBIN URINE: NEGATIVE
BLOOD URINE: NEGATIVE
CAST: 0 /LPF
COLOR: YELLOW
EPITHELIAL CELLS: 2 /HPF
GLUCOSE QUALITATIVE U: NEGATIVE MG/DL
KETONES URINE: NEGATIVE MG/DL
LEUKOCYTE ESTERASE URINE: NEGATIVE
MICROSCOPIC-UA: NORMAL
NITRITE URINE: NEGATIVE
PH URINE: 6
PROTEIN URINE: NEGATIVE MG/DL
RED BLOOD CELLS URINE: 0 /HPF
REVIEW: NORMAL
SPECIFIC GRAVITY URINE: 1.02
UROBILINOGEN URINE: 0.2 MG/DL
WHITE BLOOD CELLS URINE: 0 /HPF

## 2024-01-30 ENCOUNTER — APPOINTMENT (OUTPATIENT)
Dept: UROGYNECOLOGY | Facility: CLINIC | Age: 69
End: 2024-01-30
Payer: MEDICAID

## 2024-01-30 VITALS
WEIGHT: 220 LBS | SYSTOLIC BLOOD PRESSURE: 152 MMHG | HEIGHT: 63 IN | HEART RATE: 78 BPM | BODY MASS INDEX: 38.98 KG/M2 | DIASTOLIC BLOOD PRESSURE: 86 MMHG

## 2024-01-30 DIAGNOSIS — N39.41 URGE INCONTINENCE: ICD-10-CM

## 2024-01-30 DIAGNOSIS — N39.3 STRESS INCONTINENCE (FEMALE) (MALE): ICD-10-CM

## 2024-01-30 PROCEDURE — 99213 OFFICE O/P EST LOW 20 MIN: CPT

## 2024-01-30 RX ORDER — SOLIFENACIN SUCCINATE 5 MG/1
5 TABLET ORAL
Qty: 60 | Refills: 3 | Status: ACTIVE | COMMUNITY
Start: 2024-01-30 | End: 1900-01-01

## 2024-02-02 NOTE — REASON FOR VISIT
[[employer] ] : provided by [employer]   [Interpreters_IDNumber] : [id number] [Interpreters_FullName] : Bryce [TWNoteComboBox1] : Cook Islander

## 2024-02-02 NOTE — HISTORY OF PRESENT ILLNESS
[FreeTextEntry1] : Leisa is a 69yo with mixed urinary incontinence who presents today for follow up. She was last seen for UDS last week, which revealed +-300ml, no DO, MCFP 300ml, PVR 55ml. She reports bothersome incontinence throughout the day, both BARAK and UUI, although she reports UUI is predominant.   She reports she is taking Solifenacin 5 mg daily at since last visit in 2023. She reports with the medication, she is able to hold her urine for 2-3 hours before having UUI episodes, compared to every 30 minutes prior to beginning Solifenacin. She wakes about 2-3 times per night. She wears multiple pads per day. She denies any dry eyes, dry mouth. She does experience constipation, which she manages with Colace and/or Miralax as needed.   Daily intake: 1c coffee  3 bottles (16.9oz) H20  Rare juice   PMH: HTN, thyroid disorder, constipation, depression,  x5 PSH: ELIJAH BSO , appendectomy, cholecystectomy Social History: single, nonsmoker, unemployed

## 2024-02-02 NOTE — DISCUSSION/SUMMARY
[FreeTextEntry1] : 1. OAB  - She does note some improvement with Solifenacin, which she takes at night. Reviewed adding additional dose in the morning for more coverage. She is amendable to plan and will begin taking Solifenacin 5mg every 12 hours. We reviewed side effects.  - We discussed that should medications fail, there are procedural options to treat OAB/UUI. She expresses understanding.   2. BARAK - We reviewed the etiology of BARAK. We reviewed management options for stress urinary incontinence including: observation, pelvic floor exercises, continence devices, periurethral bulking agents,  and surgical management. We discussed surgical management options. At this time will focus on UUI symptoms.   RTO in 3 months, or sooner, as needed

## 2024-02-09 NOTE — OCCUPATIONAL THERAPY INITIAL EVALUATION ADULT - RANGE OF MOTION EXAMINATION, UPPER EXTREMITY
bilateral UE Active ROM was WFL  (within functional limits) PAST SURGICAL HISTORY:  No significant past surgical history

## 2024-02-28 LAB — BACTERIA UR CULT: NORMAL

## 2024-05-07 ENCOUNTER — APPOINTMENT (OUTPATIENT)
Dept: UROGYNECOLOGY | Facility: CLINIC | Age: 69
End: 2024-05-07

## 2024-09-17 NOTE — H&P ADULT - NSHPLABSRESULTS_GEN_ALL_CORE
Call was made at the request of the NP, to the patient, following hospital stay.  Reason for the call to check up on the patient.  Patient states she is feeling better and doing fine.  NP notified.    
13.5   8.10  )-----------( 282      ( 26 Feb 2020 23:55 )             42.1   02-26    140  |  100  |  15  ----------------------------<  137<H>  3.1<L>   |  22  |  0.87    Ca    9.4      26 Feb 2020 23:55    TPro  7.9  /  Alb  4.4  /  TBili  0.2  /  DBili  x   /  AST  26  /  ALT  13  /  AlkPhos  125<H>  02-26  < from: CT Head No Cont (02.27.20 @ 00:11) >    1. No acute intracranial hemorrhage.  2. Asymmetric prominence of the right superior anastomotic vein, indeterminate. This may represent anatomic variation, but in the appropriate clinical setting, the thrombosis is a consideration. Contrast-enhanced MRV can be considered if clinically warranted.      < end of copied text >

## 2024-10-14 NOTE — OCCUPATIONAL THERAPY INITIAL EVALUATION ADULT - RANGE OF MOTION EXAMINATION, LOWER EXTREMITY
Post-Op Assessment Note    CV Status:  Stable    Pain management: adequate       Mental Status:  Awake and sleepy   Hydration Status:  Euvolemic   PONV Controlled:  Controlled   Airway Patency:  Patent     Post Op Vitals Reviewed: Yes    No anethesia notable event occurred.    Staff: CRNA           Last Filed PACU Vitals:  Vitals Value Taken Time   Temp 97.9 °F (36.6 °C) 10/14/24 1010   Pulse 115 10/14/24 1012   /76 10/14/24 1010   Resp 30 10/14/24 1012   SpO2 98 % 10/14/24 1012   Vitals shown include unfiled device data.    Modified Anto[place]te:  Activity: 2 (10/14/2024 10:10 AM)  Respiration: 2 (10/14/2024 10:10 AM)  Circulation: 2 (no pre op BP) (10/14/2024 10:10 AM)  Consciousness: 0 (10/14/2024 10:10 AM)  Oxygen Saturation: 1 (10/14/2024 10:10 AM)  Modified Antonette Score: 7 (10/14/2024 10:10 AM)         bilateral LE Active ROM was WFL  (within functional limits)

## 2024-10-26 NOTE — H&P ADULT - NSHPPHYSICALEXAM_GEN_ALL_CORE
- - - Vital Signs Last 24 Hrs  T(C): 36.6 (27 Feb 2020 07:35), Max: 36.9 (26 Feb 2020 18:22)  T(F): 97.8 (27 Feb 2020 07:35), Max: 98.4 (26 Feb 2020 18:22)  HR: 66 (27 Feb 2020 07:35) (62 - 76)  BP: 125/71 (27 Feb 2020 07:35) (125/71 - 155/95)  BP(mean): --  RR: 14 (27 Feb 2020 07:35) (14 - 20)  SpO2: 98% (27 Feb 2020 07:35) (98% - 100%)    Neuro   Mental Status: Awake, alert, oriented to person, place, situation, time. Normal affect. Follows all commands.  Language: No dysarthria, fluent with preserved naming, repetition and comprehension.    Cranial Nerves: PERRLA (R = 3mm, L = 3mm). Visual fields intact. EOMI no nystagmus. V1-3 intact to light touch.  Mild L. facial droop at nasolabial fold.  Mod-Sev R. facial droop including frontalis.  Mildly reduced R. eye closure strength. Hearing grossly normal to conversation but hears tuning fork louder on left vs right.  Symmetric palate elevation in midline.  Head turning & shoulder shrug intact b/l. Tongue midline, normal movements, no atrophy.  Motor: Normal muscle bulk & tone. No noticeable tremor, myoclonus or pronator drift. 5/5 strength on individual strength testing, LLE give way weakness due to pain.  Sensation: Symmetric B/L preserved sensation to light touch, pin prick, position.    Cortical: No extinction on double simultaneous touch and no signs of neglect.   Reflexes: 2/4 throughout.  Plantar Responses are downgoing B/L  Coordination: Intact rapid-alternating movements. No dysmetria on finger-to-nose or heel-to-shin.  No dysdiadochokinesia  Gait: Requires two person assist to stand, wide stance, reduced stride length, touch off, arm swing and tucker.   Abnormal Romberg. Moderate to severe postural instability.    HINTS test: No nystagmus and no skew.  R. head impulse induced symptoms of dizziness.  L. head impulse induced dizziness less so and mild neck pain.   [place]: No nystagmus.

## 2024-11-11 ENCOUNTER — RX CHANGE (OUTPATIENT)
Age: 69
End: 2024-11-11

## 2024-11-11 RX ORDER — SOLIFENACIN SUCCINATE 5 MG/1
5 TABLET ORAL
Qty: 90 | Refills: 0 | Status: ACTIVE | COMMUNITY
Start: 1900-01-01 | End: 1900-01-01

## 2025-01-13 ENCOUNTER — RX CHANGE (OUTPATIENT)
Age: 70
End: 2025-01-13

## 2025-01-13 RX ORDER — SOLIFENACIN SUCCINATE 5 MG/1
5 TABLET ORAL
Qty: 90 | Refills: 0 | Status: ACTIVE | COMMUNITY
Start: 1900-01-01 | End: 1900-01-01